# Patient Record
Sex: FEMALE | Race: WHITE | NOT HISPANIC OR LATINO
[De-identification: names, ages, dates, MRNs, and addresses within clinical notes are randomized per-mention and may not be internally consistent; named-entity substitution may affect disease eponyms.]

---

## 2019-03-30 ENCOUNTER — TRANSCRIPTION ENCOUNTER (OUTPATIENT)
Age: 79
End: 2019-03-30

## 2019-06-24 ENCOUNTER — TRANSCRIPTION ENCOUNTER (OUTPATIENT)
Age: 79
End: 2019-06-24

## 2020-07-17 ENCOUNTER — APPOINTMENT (OUTPATIENT)
Dept: CARDIOTHORACIC SURGERY | Facility: CLINIC | Age: 80
End: 2020-07-17
Payer: MEDICARE

## 2020-07-17 VITALS
WEIGHT: 148 LBS | BODY MASS INDEX: 29.84 KG/M2 | TEMPERATURE: 97.7 F | SYSTOLIC BLOOD PRESSURE: 130 MMHG | DIASTOLIC BLOOD PRESSURE: 71 MMHG | HEART RATE: 81 BPM | OXYGEN SATURATION: 96 % | HEIGHT: 59 IN

## 2020-07-17 PROCEDURE — 99204 OFFICE O/P NEW MOD 45 MIN: CPT

## 2020-07-20 RX ORDER — MULTIVITAMIN
TABLET ORAL
Refills: 0 | Status: ACTIVE | COMMUNITY

## 2020-07-20 NOTE — PHYSICAL EXAM
[General Appearance - Alert] : alert [General Appearance - In No Acute Distress] : in no acute distress [Sclera] : the sclera and conjunctiva were normal [Neck Appearance] : the appearance of the neck was normal [Outer Ear] : the ears and nose were normal in appearance [Jugular Venous Distention Increased] : there was no jugular-venous distention [] : no respiratory distress [Exaggerated Use Of Accessory Muscles For Inspiration] : no accessory muscle use [Auscultation Breath Sounds / Voice Sounds] : lungs were clear to auscultation bilaterally [Respiration, Rhythm And Depth] : normal respiratory rhythm and effort [Apical Impulse] : the apical impulse was normal [Heart Rate And Rhythm] : heart rate was normal and rhythm regular [Diminished Respiratory Excursion] : normal chest expansion [Examination Of The Chest] : the chest was normal in appearance [Chest Visual Inspection Thoracic Asymmetry] : no chest asymmetry [Abdomen Soft] : soft [Bowel Sounds] : normal bowel sounds [Abdomen Tenderness] : non-tender [Abnormal Walk] : normal gait [No Focal Deficits] : no focal deficits [Oriented To Time, Place, And Person] : oriented to person, place, and time [Skin Color & Pigmentation] : normal skin color and pigmentation [FreeTextEntry1] : Systolic murmur best heard at the apex. 1+ pitting edema to bilateral LEs.

## 2020-07-20 NOTE — REVIEW OF SYSTEMS
[Feeling Tired] : feeling tired [Lower Ext Edema] : lower extremity edema [Cough] : cough [SOB on Exertion] : shortness of breath during exertion [Negative] : Psychiatric [Fever] : no fever [Chills] : no chills [Feeling Poorly] : not feeling poorly [Heart Rate Is Slow] : the heart rate was not slow [Heart Rate Is Fast] : the heart rate was not fast [Chest Pain] : no chest pain [Palpitations] : no palpitations [Shortness Of Breath] : no shortness of breath [Leg Claudication] : no intermittent leg claudication [Wheezing] : no wheezing [PND] : no PND [Orthopnea] : no orthopnea

## 2020-07-20 NOTE — HISTORY OF PRESENT ILLNESS
[FreeTextEntry1] : \par 80 year old female with a history of HTN, gout and chronic diastolic heart failure with severe mitral regurgitation who has been referred for further evaluation of her valvular heart disease.\par \par The patient was admitted to [facility] in February for heart failure. An ECHO done at that time showed a normal EF and moderate to severe mitral regurgitation with a ruptured chordae; PA pressure 80 mmHg. \par \par Since then, the patient has continued to complaint of a persistent, frothy cough. She also reports intermittent LE edema. The patient has SOB when walking up the stairs or doing laundry; she denies SOB at rest. She also denies chest pain, orthopnea, PND, dizziness, syncope and palpitations. The patient has been monitoring her daily weights and they are stable. \par \par The patient lives at home by herself. She remains independent in her ADLs. Her daughter lives close by.

## 2020-08-16 ENCOUNTER — RESULT REVIEW (OUTPATIENT)
Age: 80
End: 2020-08-16

## 2020-08-18 ENCOUNTER — FORM ENCOUNTER (OUTPATIENT)
Age: 80
End: 2020-08-18

## 2020-08-19 ENCOUNTER — APPOINTMENT (OUTPATIENT)
Dept: CARDIOTHORACIC SURGERY | Facility: CLINIC | Age: 80
End: 2020-08-19
Payer: MEDICARE

## 2020-08-19 ENCOUNTER — OUTPATIENT (OUTPATIENT)
Dept: OUTPATIENT SERVICES | Facility: HOSPITAL | Age: 80
LOS: 1 days | End: 2020-08-19
Payer: MEDICARE

## 2020-08-19 ENCOUNTER — TRANSCRIPTION ENCOUNTER (OUTPATIENT)
Age: 80
End: 2020-08-19

## 2020-08-19 VITALS
RESPIRATION RATE: 18 BRPM | BODY MASS INDEX: 27.82 KG/M2 | SYSTOLIC BLOOD PRESSURE: 132 MMHG | TEMPERATURE: 96.8 F | DIASTOLIC BLOOD PRESSURE: 65 MMHG | HEIGHT: 59 IN | HEART RATE: 66 BPM | OXYGEN SATURATION: 96 % | WEIGHT: 138 LBS

## 2020-08-19 DIAGNOSIS — I34.0 NONRHEUMATIC MITRAL (VALVE) INSUFFICIENCY: ICD-10-CM

## 2020-08-19 PROCEDURE — 93306 TTE W/DOPPLER COMPLETE: CPT | Mod: 26

## 2020-08-19 PROCEDURE — 93312 ECHO TRANSESOPHAGEAL: CPT

## 2020-08-19 PROCEDURE — 76377 3D RENDER W/INTRP POSTPROCES: CPT | Mod: 26

## 2020-08-19 PROCEDURE — 99202 OFFICE O/P NEW SF 15 MIN: CPT

## 2020-08-19 PROCEDURE — 99214 OFFICE O/P EST MOD 30 MIN: CPT

## 2020-08-20 NOTE — REASON FOR VISIT
[Follow-Up: _____] : a [unfilled] follow-up visit [Family Member] : family member [FreeTextEntry1] : mitral regurgitation.

## 2020-08-20 NOTE — PHYSICAL EXAM
[Sclera] : the sclera and conjunctiva were normal [Jugular Venous Distention Increased] : there was no jugular-venous distention [Neck Appearance] : the appearance of the neck was normal [Respiration, Rhythm And Depth] : normal respiratory rhythm and effort [Exaggerated Use Of Accessory Muscles For Inspiration] : no accessory muscle use [] : no respiratory distress [Apical Impulse] : the apical impulse was normal [Auscultation Breath Sounds / Voice Sounds] : lungs were clear to auscultation bilaterally [Heart Rate And Rhythm] : heart rate was normal and rhythm regular [Examination Of The Chest] : the chest was normal in appearance [Chest Visual Inspection Thoracic Asymmetry] : no chest asymmetry [Diminished Respiratory Excursion] : normal chest expansion [Abdomen Soft] : soft [Abdomen Tenderness] : non-tender [Abnormal Walk] : normal gait [Skin Color & Pigmentation] : normal skin color and pigmentation [No Focal Deficits] : no focal deficits [General Appearance - In No Acute Distress] : in no acute distress [General Appearance - Alert] : alert [Oriented To Time, Place, And Person] : oriented to person, place, and time [Outer Ear] : the ears and nose were normal in appearance [FreeTextEntry1] : Systolic murmur best heard at the apex. 1+ pitting edema to bilateral LEs.

## 2020-08-20 NOTE — REVIEW OF SYSTEMS
[Feeling Tired] : feeling tired [Lower Ext Edema] : lower extremity edema [SOB on Exertion] : shortness of breath during exertion [Cough] : cough [Negative] : Neurological [Fever] : no fever [Chills] : no chills [Feeling Poorly] : not feeling poorly [Heart Rate Is Slow] : the heart rate was not slow [Heart Rate Is Fast] : the heart rate was not fast [Chest Pain] : no chest pain [Palpitations] : no palpitations [Leg Claudication] : no intermittent leg claudication [Wheezing] : no wheezing [Shortness Of Breath] : no shortness of breath [PND] : no PND [Orthopnea] : no orthopnea

## 2020-08-21 NOTE — REVIEW OF SYSTEMS
[Feeling Tired] : feeling tired [Lower Ext Edema] : lower extremity edema [Cough] : cough [SOB on Exertion] : shortness of breath during exertion [Negative] : Psychiatric [Chills] : no chills [Feeling Poorly] : not feeling poorly [Fever] : no fever [Chest Pain] : no chest pain [Heart Rate Is Fast] : the heart rate was not fast [Heart Rate Is Slow] : the heart rate was not slow [Leg Claudication] : no intermittent leg claudication [Shortness Of Breath] : no shortness of breath [Wheezing] : no wheezing [Palpitations] : no palpitations [PND] : no PND [Orthopnea] : no orthopnea

## 2020-08-21 NOTE — HISTORY OF PRESENT ILLNESS
[FreeTextEntry1] : 80 year old female with a history of HTN, gout and chronic diastolic heart failure with severe mitral regurgitation who present for follow up after testing.\par \par The patient continues to complaint of a persistent cough and intermittent LE edema. The patient has SOB when walking up the stairs or doing laundry; she denies SOB at rest. She also denies chest pain, orthopnea, PND, dizziness, syncope and palpitations.\par \par The patient lives at home by herself. She remains independent in her ADLs. Her daughter lives close by.

## 2020-08-21 NOTE — PHYSICAL EXAM
[Sclera] : the sclera and conjunctiva were normal [Neck Appearance] : the appearance of the neck was normal [] : no respiratory distress [Jugular Venous Distention Increased] : there was no jugular-venous distention [Exaggerated Use Of Accessory Muscles For Inspiration] : no accessory muscle use [Auscultation Breath Sounds / Voice Sounds] : lungs were clear to auscultation bilaterally [Respiration, Rhythm And Depth] : normal respiratory rhythm and effort [Apical Impulse] : the apical impulse was normal [Heart Rate And Rhythm] : heart rate was normal and rhythm regular [Diminished Respiratory Excursion] : normal chest expansion [Abdomen Soft] : soft [Chest Visual Inspection Thoracic Asymmetry] : no chest asymmetry [Examination Of The Chest] : the chest was normal in appearance [Abnormal Walk] : normal gait [Skin Color & Pigmentation] : normal skin color and pigmentation [Abdomen Tenderness] : non-tender [No Focal Deficits] : no focal deficits [Oriented To Time, Place, And Person] : oriented to person, place, and time [General Appearance - In No Acute Distress] : in no acute distress [Outer Ear] : the ears and nose were normal in appearance [General Appearance - Alert] : alert [FreeTextEntry1] : Systolic murmur best heard at the apex. 1+ pitting edema to bilateral LEs.

## 2020-09-21 ENCOUNTER — RESULT REVIEW (OUTPATIENT)
Age: 80
End: 2020-09-21

## 2020-09-23 ENCOUNTER — TRANSCRIPTION ENCOUNTER (OUTPATIENT)
Age: 80
End: 2020-09-23

## 2020-09-23 VITALS
TEMPERATURE: 98 F | WEIGHT: 138.01 LBS | HEART RATE: 76 BPM | SYSTOLIC BLOOD PRESSURE: 131 MMHG | OXYGEN SATURATION: 98 % | HEIGHT: 59 IN | RESPIRATION RATE: 16 BRPM | DIASTOLIC BLOOD PRESSURE: 70 MMHG

## 2020-09-23 RX ORDER — INFLUENZA VIRUS VACCINE 15; 15; 15; 15 UG/.5ML; UG/.5ML; UG/.5ML; UG/.5ML
0.5 SUSPENSION INTRAMUSCULAR ONCE
Refills: 0 | Status: DISCONTINUED | OUTPATIENT
Start: 2020-09-24 | End: 2020-09-24

## 2020-09-24 ENCOUNTER — APPOINTMENT (OUTPATIENT)
Dept: CARDIOTHORACIC SURGERY | Facility: HOSPITAL | Age: 80
End: 2020-09-24

## 2020-09-24 ENCOUNTER — INPATIENT (INPATIENT)
Facility: HOSPITAL | Age: 80
LOS: 0 days | Discharge: HOME | DRG: 266 | End: 2020-09-25
Attending: THORACIC SURGERY (CARDIOTHORACIC VASCULAR SURGERY) | Admitting: THORACIC SURGERY (CARDIOTHORACIC VASCULAR SURGERY)
Payer: MEDICARE

## 2020-09-24 LAB
ALBUMIN SERPL ELPH-MCNC: 3.3 G/DL — SIGNIFICANT CHANGE UP (ref 3.3–5)
ALBUMIN SERPL ELPH-MCNC: 3.5 G/DL — SIGNIFICANT CHANGE UP (ref 3.3–5)
ALBUMIN SERPL ELPH-MCNC: 4.6 G/DL — SIGNIFICANT CHANGE UP (ref 3.3–5)
ALP SERPL-CCNC: 59 U/L — SIGNIFICANT CHANGE UP (ref 40–120)
ALP SERPL-CCNC: 63 U/L — SIGNIFICANT CHANGE UP (ref 40–120)
ALP SERPL-CCNC: 74 U/L — SIGNIFICANT CHANGE UP (ref 40–120)
ALT FLD-CCNC: 12 U/L — SIGNIFICANT CHANGE UP (ref 10–45)
ALT FLD-CCNC: 13 U/L — SIGNIFICANT CHANGE UP (ref 10–45)
ALT FLD-CCNC: 15 U/L — SIGNIFICANT CHANGE UP (ref 10–45)
ANION GAP SERPL CALC-SCNC: 10 MMOL/L — SIGNIFICANT CHANGE UP (ref 5–17)
ANION GAP SERPL CALC-SCNC: 12 MMOL/L — SIGNIFICANT CHANGE UP (ref 5–17)
ANION GAP SERPL CALC-SCNC: 12 MMOL/L — SIGNIFICANT CHANGE UP (ref 5–17)
APTT BLD: 28.4 SEC — SIGNIFICANT CHANGE UP (ref 27.5–35.5)
APTT BLD: 31.4 SEC — SIGNIFICANT CHANGE UP (ref 27.5–35.5)
APTT BLD: 44.7 SEC — HIGH (ref 27.5–35.5)
AST SERPL-CCNC: 21 U/L — SIGNIFICANT CHANGE UP (ref 10–40)
AST SERPL-CCNC: 22 U/L — SIGNIFICANT CHANGE UP (ref 10–40)
AST SERPL-CCNC: 28 U/L — SIGNIFICANT CHANGE UP (ref 10–40)
BASOPHILS # BLD AUTO: 0.03 K/UL — SIGNIFICANT CHANGE UP (ref 0–0.2)
BASOPHILS NFR BLD AUTO: 0.5 % — SIGNIFICANT CHANGE UP (ref 0–2)
BILIRUB SERPL-MCNC: 0.4 MG/DL — SIGNIFICANT CHANGE UP (ref 0.2–1.2)
BILIRUB SERPL-MCNC: 0.5 MG/DL — SIGNIFICANT CHANGE UP (ref 0.2–1.2)
BILIRUB SERPL-MCNC: 1 MG/DL — SIGNIFICANT CHANGE UP (ref 0.2–1.2)
BLD GP AB SCN SERPL QL: NEGATIVE — SIGNIFICANT CHANGE UP
BUN SERPL-MCNC: 20 MG/DL — SIGNIFICANT CHANGE UP (ref 7–23)
BUN SERPL-MCNC: 21 MG/DL — SIGNIFICANT CHANGE UP (ref 7–23)
BUN SERPL-MCNC: 21 MG/DL — SIGNIFICANT CHANGE UP (ref 7–23)
CALCIUM SERPL-MCNC: 10 MG/DL — SIGNIFICANT CHANGE UP (ref 8.4–10.5)
CALCIUM SERPL-MCNC: 8.9 MG/DL — SIGNIFICANT CHANGE UP (ref 8.4–10.5)
CALCIUM SERPL-MCNC: 9.3 MG/DL — SIGNIFICANT CHANGE UP (ref 8.4–10.5)
CHLORIDE SERPL-SCNC: 100 MMOL/L — SIGNIFICANT CHANGE UP (ref 96–108)
CHLORIDE SERPL-SCNC: 105 MMOL/L — SIGNIFICANT CHANGE UP (ref 96–108)
CHLORIDE SERPL-SCNC: 107 MMOL/L — SIGNIFICANT CHANGE UP (ref 96–108)
CO2 SERPL-SCNC: 25 MMOL/L — SIGNIFICANT CHANGE UP (ref 22–31)
CO2 SERPL-SCNC: 26 MMOL/L — SIGNIFICANT CHANGE UP (ref 22–31)
CO2 SERPL-SCNC: 29 MMOL/L — SIGNIFICANT CHANGE UP (ref 22–31)
CREAT SERPL-MCNC: 0.91 MG/DL — SIGNIFICANT CHANGE UP (ref 0.5–1.3)
CREAT SERPL-MCNC: 1.07 MG/DL — SIGNIFICANT CHANGE UP (ref 0.5–1.3)
CREAT SERPL-MCNC: 1.11 MG/DL — SIGNIFICANT CHANGE UP (ref 0.5–1.3)
EOSINOPHIL # BLD AUTO: 0.08 K/UL — SIGNIFICANT CHANGE UP (ref 0–0.5)
EOSINOPHIL NFR BLD AUTO: 1.4 % — SIGNIFICANT CHANGE UP (ref 0–6)
GAS PNL BLDA: SIGNIFICANT CHANGE UP
GAS PNL BLDA: SIGNIFICANT CHANGE UP
GLUCOSE SERPL-MCNC: 110 MG/DL — HIGH (ref 70–99)
GLUCOSE SERPL-MCNC: 117 MG/DL — HIGH (ref 70–99)
GLUCOSE SERPL-MCNC: 123 MG/DL — HIGH (ref 70–99)
HCT VFR BLD CALC: 31.3 % — LOW (ref 34.5–45)
HCT VFR BLD CALC: 32.9 % — LOW (ref 34.5–45)
HCT VFR BLD CALC: 39.1 % — SIGNIFICANT CHANGE UP (ref 34.5–45)
HGB BLD-MCNC: 10.3 G/DL — LOW (ref 11.5–15.5)
HGB BLD-MCNC: 10.6 G/DL — LOW (ref 11.5–15.5)
HGB BLD-MCNC: 12.6 G/DL — SIGNIFICANT CHANGE UP (ref 11.5–15.5)
IMM GRANULOCYTES NFR BLD AUTO: 0.4 % — SIGNIFICANT CHANGE UP (ref 0–1.5)
INR BLD: 0.98 — SIGNIFICANT CHANGE UP (ref 0.88–1.16)
INR BLD: 1.07 — SIGNIFICANT CHANGE UP (ref 0.88–1.16)
INR BLD: 1.12 — SIGNIFICANT CHANGE UP (ref 0.88–1.16)
LACTATE SERPL-SCNC: 0.9 MMOL/L — SIGNIFICANT CHANGE UP (ref 0.5–2)
LACTATE SERPL-SCNC: 1.4 MMOL/L — SIGNIFICANT CHANGE UP (ref 0.5–2)
LYMPHOCYTES # BLD AUTO: 1.63 K/UL — SIGNIFICANT CHANGE UP (ref 1–3.3)
LYMPHOCYTES # BLD AUTO: 29 % — SIGNIFICANT CHANGE UP (ref 13–44)
MAGNESIUM SERPL-MCNC: 1.8 MG/DL — SIGNIFICANT CHANGE UP (ref 1.6–2.6)
MAGNESIUM SERPL-MCNC: 2.3 MG/DL — SIGNIFICANT CHANGE UP (ref 1.6–2.6)
MCHC RBC-ENTMCNC: 31 PG — SIGNIFICANT CHANGE UP (ref 27–34)
MCHC RBC-ENTMCNC: 31.2 PG — SIGNIFICANT CHANGE UP (ref 27–34)
MCHC RBC-ENTMCNC: 31.4 PG — SIGNIFICANT CHANGE UP (ref 27–34)
MCHC RBC-ENTMCNC: 32.2 GM/DL — SIGNIFICANT CHANGE UP (ref 32–36)
MCHC RBC-ENTMCNC: 32.2 GM/DL — SIGNIFICANT CHANGE UP (ref 32–36)
MCHC RBC-ENTMCNC: 32.9 GM/DL — SIGNIFICANT CHANGE UP (ref 32–36)
MCV RBC AUTO: 94.8 FL — SIGNIFICANT CHANGE UP (ref 80–100)
MCV RBC AUTO: 96.2 FL — SIGNIFICANT CHANGE UP (ref 80–100)
MCV RBC AUTO: 97.5 FL — SIGNIFICANT CHANGE UP (ref 80–100)
MONOCYTES # BLD AUTO: 0.35 K/UL — SIGNIFICANT CHANGE UP (ref 0–0.9)
MONOCYTES NFR BLD AUTO: 6.2 % — SIGNIFICANT CHANGE UP (ref 2–14)
NEUTROPHILS # BLD AUTO: 3.52 K/UL — SIGNIFICANT CHANGE UP (ref 1.8–7.4)
NEUTROPHILS NFR BLD AUTO: 62.5 % — SIGNIFICANT CHANGE UP (ref 43–77)
NRBC # BLD: 0 /100 WBCS — SIGNIFICANT CHANGE UP (ref 0–0)
PHOSPHATE SERPL-MCNC: 3.7 MG/DL — SIGNIFICANT CHANGE UP (ref 2.5–4.5)
PHOSPHATE SERPL-MCNC: 4.7 MG/DL — HIGH (ref 2.5–4.5)
PLATELET # BLD AUTO: 136 K/UL — LOW (ref 150–400)
PLATELET # BLD AUTO: 141 K/UL — LOW (ref 150–400)
PLATELET # BLD AUTO: 176 K/UL — SIGNIFICANT CHANGE UP (ref 150–400)
POTASSIUM SERPL-MCNC: 3.2 MMOL/L — LOW (ref 3.5–5.3)
POTASSIUM SERPL-MCNC: 3.4 MMOL/L — LOW (ref 3.5–5.3)
POTASSIUM SERPL-MCNC: 4 MMOL/L — SIGNIFICANT CHANGE UP (ref 3.5–5.3)
POTASSIUM SERPL-SCNC: 3.2 MMOL/L — LOW (ref 3.5–5.3)
POTASSIUM SERPL-SCNC: 3.4 MMOL/L — LOW (ref 3.5–5.3)
POTASSIUM SERPL-SCNC: 4 MMOL/L — SIGNIFICANT CHANGE UP (ref 3.5–5.3)
PROT SERPL-MCNC: 6.2 G/DL — SIGNIFICANT CHANGE UP (ref 6–8.3)
PROT SERPL-MCNC: 6.2 G/DL — SIGNIFICANT CHANGE UP (ref 6–8.3)
PROT SERPL-MCNC: 8.1 G/DL — SIGNIFICANT CHANGE UP (ref 6–8.3)
PROTHROM AB SERPL-ACNC: 11.8 SEC — SIGNIFICANT CHANGE UP (ref 10.6–13.6)
PROTHROM AB SERPL-ACNC: 12.8 SEC — SIGNIFICANT CHANGE UP (ref 10.6–13.6)
PROTHROM AB SERPL-ACNC: 13.4 SEC — SIGNIFICANT CHANGE UP (ref 10.6–13.6)
RBC # BLD: 3.3 M/UL — LOW (ref 3.8–5.2)
RBC # BLD: 3.42 M/UL — LOW (ref 3.8–5.2)
RBC # BLD: 4.01 M/UL — SIGNIFICANT CHANGE UP (ref 3.8–5.2)
RBC # FLD: 13.2 % — SIGNIFICANT CHANGE UP (ref 10.3–14.5)
RBC # FLD: 13.3 % — SIGNIFICANT CHANGE UP (ref 10.3–14.5)
RBC # FLD: 13.4 % — SIGNIFICANT CHANGE UP (ref 10.3–14.5)
RH IG SCN BLD-IMP: POSITIVE — SIGNIFICANT CHANGE UP
SODIUM SERPL-SCNC: 141 MMOL/L — SIGNIFICANT CHANGE UP (ref 135–145)
SODIUM SERPL-SCNC: 142 MMOL/L — SIGNIFICANT CHANGE UP (ref 135–145)
SODIUM SERPL-SCNC: 143 MMOL/L — SIGNIFICANT CHANGE UP (ref 135–145)
WBC # BLD: 5.63 K/UL — SIGNIFICANT CHANGE UP (ref 3.8–10.5)
WBC # BLD: 8.02 K/UL — SIGNIFICANT CHANGE UP (ref 3.8–10.5)
WBC # BLD: 8.23 K/UL — SIGNIFICANT CHANGE UP (ref 3.8–10.5)
WBC # FLD AUTO: 5.63 K/UL — SIGNIFICANT CHANGE UP (ref 3.8–10.5)
WBC # FLD AUTO: 8.02 K/UL — SIGNIFICANT CHANGE UP (ref 3.8–10.5)
WBC # FLD AUTO: 8.23 K/UL — SIGNIFICANT CHANGE UP (ref 3.8–10.5)

## 2020-09-24 PROCEDURE — 93010 ELECTROCARDIOGRAM REPORT: CPT

## 2020-09-24 PROCEDURE — 76376 3D RENDER W/INTRP POSTPROCES: CPT | Mod: 26,59

## 2020-09-24 PROCEDURE — 71045 X-RAY EXAM CHEST 1 VIEW: CPT | Mod: 26

## 2020-09-24 PROCEDURE — 93355 ECHO TRANSESOPHAGEAL (TEE): CPT

## 2020-09-24 PROCEDURE — 33418 REPAIR TCAT MITRAL VALVE: CPT | Mod: 62,Q0

## 2020-09-24 RX ORDER — HEPARIN SODIUM 5000 [USP'U]/ML
5000 INJECTION INTRAVENOUS; SUBCUTANEOUS EVERY 8 HOURS
Refills: 0 | Status: DISCONTINUED | OUTPATIENT
Start: 2020-09-24 | End: 2020-09-25

## 2020-09-24 RX ORDER — POTASSIUM CHLORIDE 20 MEQ
20 PACKET (EA) ORAL
Refills: 0 | Status: DISCONTINUED | OUTPATIENT
Start: 2020-09-24 | End: 2020-09-25

## 2020-09-24 RX ORDER — SODIUM CHLORIDE 9 MG/ML
1000 INJECTION INTRAMUSCULAR; INTRAVENOUS; SUBCUTANEOUS
Refills: 0 | Status: DISCONTINUED | OUTPATIENT
Start: 2020-09-24 | End: 2020-09-25

## 2020-09-24 RX ORDER — CHOLECALCIFEROL (VITAMIN D3) 125 MCG
0 CAPSULE ORAL
Qty: 0 | Refills: 0 | DISCHARGE

## 2020-09-24 RX ORDER — HYDRALAZINE HCL 50 MG
5 TABLET ORAL ONCE
Refills: 0 | Status: COMPLETED | OUTPATIENT
Start: 2020-09-24 | End: 2020-09-24

## 2020-09-24 RX ORDER — METOPROLOL TARTRATE 50 MG
2.5 TABLET ORAL ONCE
Refills: 0 | Status: COMPLETED | OUTPATIENT
Start: 2020-09-24 | End: 2020-09-24

## 2020-09-24 RX ORDER — MAGNESIUM SULFATE 500 MG/ML
2 VIAL (ML) INJECTION ONCE
Refills: 0 | Status: COMPLETED | OUTPATIENT
Start: 2020-09-24 | End: 2020-09-24

## 2020-09-24 RX ORDER — MAGNESIUM SULFATE 500 MG/ML
2 VIAL (ML) INJECTION ONCE
Refills: 0 | Status: DISCONTINUED | OUTPATIENT
Start: 2020-09-24 | End: 2020-09-24

## 2020-09-24 RX ORDER — CALCIUM GLUCONATE 100 MG/ML
2 VIAL (ML) INTRAVENOUS ONCE
Refills: 0 | Status: COMPLETED | OUTPATIENT
Start: 2020-09-24 | End: 2020-09-25

## 2020-09-24 RX ORDER — HYDRALAZINE HCL 50 MG
5 TABLET ORAL ONCE
Refills: 0 | Status: DISCONTINUED | OUTPATIENT
Start: 2020-09-24 | End: 2020-09-24

## 2020-09-24 RX ORDER — INFLUENZA VIRUS VACCINE 15; 15; 15; 15 UG/.5ML; UG/.5ML; UG/.5ML; UG/.5ML
0.7 SUSPENSION INTRAMUSCULAR ONCE
Refills: 0 | Status: DISCONTINUED | OUTPATIENT
Start: 2020-09-24 | End: 2020-09-25

## 2020-09-24 RX ORDER — ASPIRIN/CALCIUM CARB/MAGNESIUM 324 MG
81 TABLET ORAL DAILY
Refills: 0 | Status: DISCONTINUED | OUTPATIENT
Start: 2020-09-25 | End: 2020-09-25

## 2020-09-24 RX ORDER — POTASSIUM CHLORIDE 20 MEQ
10 PACKET (EA) ORAL ONCE
Refills: 0 | Status: COMPLETED | OUTPATIENT
Start: 2020-09-24 | End: 2020-09-24

## 2020-09-24 RX ORDER — ACETAMINOPHEN 500 MG
1000 TABLET ORAL ONCE
Refills: 0 | Status: COMPLETED | OUTPATIENT
Start: 2020-09-24 | End: 2020-09-24

## 2020-09-24 RX ORDER — MEPERIDINE HYDROCHLORIDE 50 MG/ML
25 INJECTION INTRAMUSCULAR; INTRAVENOUS; SUBCUTANEOUS ONCE
Refills: 0 | Status: DISCONTINUED | OUTPATIENT
Start: 2020-09-24 | End: 2020-09-24

## 2020-09-24 RX ORDER — FUROSEMIDE 40 MG
20 TABLET ORAL ONCE
Refills: 0 | Status: COMPLETED | OUTPATIENT
Start: 2020-09-24 | End: 2020-09-24

## 2020-09-24 RX ORDER — CLOPIDOGREL BISULFATE 75 MG/1
75 TABLET, FILM COATED ORAL DAILY
Refills: 0 | Status: DISCONTINUED | OUTPATIENT
Start: 2020-09-25 | End: 2020-09-25

## 2020-09-24 RX ORDER — ASPIRIN/CALCIUM CARB/MAGNESIUM 324 MG
325 TABLET ORAL ONCE
Refills: 0 | Status: COMPLETED | OUTPATIENT
Start: 2020-09-24 | End: 2020-09-24

## 2020-09-24 RX ORDER — ALLOPURINOL 300 MG
100 TABLET ORAL DAILY
Refills: 0 | Status: DISCONTINUED | OUTPATIENT
Start: 2020-09-25 | End: 2020-09-25

## 2020-09-24 RX ORDER — CLOPIDOGREL BISULFATE 75 MG/1
300 TABLET, FILM COATED ORAL ONCE
Refills: 0 | Status: COMPLETED | OUTPATIENT
Start: 2020-09-24 | End: 2020-09-24

## 2020-09-24 RX ORDER — PANTOPRAZOLE SODIUM 20 MG/1
40 TABLET, DELAYED RELEASE ORAL
Refills: 0 | Status: DISCONTINUED | OUTPATIENT
Start: 2020-09-24 | End: 2020-09-25

## 2020-09-24 RX ORDER — POTASSIUM CHLORIDE 20 MEQ
20 PACKET (EA) ORAL ONCE
Refills: 0 | Status: COMPLETED | OUTPATIENT
Start: 2020-09-24 | End: 2020-09-24

## 2020-09-24 RX ORDER — CEFAZOLIN SODIUM 1 G
2000 VIAL (EA) INJECTION EVERY 8 HOURS
Refills: 0 | Status: DISCONTINUED | OUTPATIENT
Start: 2020-09-24 | End: 2020-09-25

## 2020-09-24 RX ADMIN — Medication 2.5 MILLIGRAM(S): at 15:11

## 2020-09-24 RX ADMIN — Medication 2000 MILLIGRAM(S): at 15:52

## 2020-09-24 RX ADMIN — Medication 400 MILLIGRAM(S): at 12:56

## 2020-09-24 RX ADMIN — Medication 5 MILLIGRAM(S): at 15:21

## 2020-09-24 RX ADMIN — Medication 2000 MILLIGRAM(S): at 23:52

## 2020-09-24 RX ADMIN — Medication 50 MILLIEQUIVALENT(S): at 14:18

## 2020-09-24 RX ADMIN — Medication 325 MILLIGRAM(S): at 07:22

## 2020-09-24 RX ADMIN — Medication 50 GRAM(S): at 14:49

## 2020-09-24 RX ADMIN — Medication 2.5 MILLIGRAM(S): at 14:49

## 2020-09-24 RX ADMIN — HEPARIN SODIUM 5000 UNIT(S): 5000 INJECTION INTRAVENOUS; SUBCUTANEOUS at 21:17

## 2020-09-24 RX ADMIN — CLOPIDOGREL BISULFATE 300 MILLIGRAM(S): 75 TABLET, FILM COATED ORAL at 07:22

## 2020-09-24 RX ADMIN — Medication 1000 MILLIGRAM(S): at 13:15

## 2020-09-24 RX ADMIN — Medication 5 MILLIGRAM(S): at 15:52

## 2020-09-24 RX ADMIN — Medication 20 MILLIGRAM(S): at 15:29

## 2020-09-24 NOTE — H&P ADULT - NSHPPHYSICALEXAM_GEN_ALL_CORE
Appearance: No acute distress.  Neurologic: AAOx3, no AMS or focal deficits.  Responds appropriately to verbal and physical stimuli; exhibits purposeful movement in all extremities.  HEENT:   MMM, PERRLA, EOMI	b/l  Neck: Supple, + JVD/ - JVD; +/- Carotid Bruit   Cardiovascular: RRR, S1 S2. No m/r/g.  Respiratory: No acute respiratory distress. CTA b/l, no w/r/r.   Gastrointestinal:  Soft, non-tender, non-distended, + BS.	  Skin: No rashes. No ecchymoses. No cyanosis.  Extremities: Exhibits normal range of motion, no clubbing, cyanosis or edema.  Vascular: Peripheral pulses palpable 2+ bilaterally. Appearance: No acute distress.  Neurologic: AAOx3, no AMS or focal deficits.  Responds appropriately to verbal and physical stimuli; exhibits purposeful movement in all extremities.  HEENT:   MMM, PERRLA, EOMI	b/l  Neck: Supple,  - JVD; - Carotid Bruit   Cardiovascular: RRR, S1 S2. No m/r/g.  Respiratory: No acute respiratory distress. CTA b/l, no w/r/r.   Gastrointestinal:  Soft, non-tender, non-distended, + BS.	  Skin: No rashes. No ecchymoses. No cyanosis.  Extremities: Exhibits normal range of motion, no clubbing, cyanosis or edema.  Vascular: Peripheral pulses doppler + b/l PT and DP

## 2020-09-24 NOTE — H&P ADULT - ASSESSMENT
79 y/o female with hx of HTN, gout and chronic diastolic CHF with severe MR who originally presented with persistent cough and intermittent LE edema. She also states he has SOB when walking up the stairs or doing laundry but denies SOB at rest. Recent echo showed echo showed normal LV and RV function, bileaflet mitral valve prolapse involving A1-P1 and A2-Pr with flail A1-A2 scallops, and a cleft between P1-P2. Reely mobile linear echodensity on the atrial side of the anterior mitral leaflet measuring 1cm that likely represents a torn mitral chord with severe MR. Patient was evaluated by Dr. Núñez and was found to be a candidate for mitraclip placement. She denies any recent CP, palpitations, orthopnea, PND, dizziness, syncope. She now presents for her planned L and R heart cath with possible PCI and mitraclip placement    - chart reviewed, consent obtained  - EKG, labs, T&S performed  - blood products on hold for OR  - COVID negative 9/21  - patient received  mg and Plavix 300 mg prior to procedure  - plan for ICU post op

## 2020-09-24 NOTE — PROGRESS NOTE ADULT - SUBJECTIVE AND OBJECTIVE BOX
Patient discussed on morning rounds with Dr. Núñez    Operation / Date: L and R heart cath, mitraclip X2    SUBJECTIVE ASSESSMENT:  80y Female assessed  at bedside after procedure. She is drowsy but appropriately responsive. Denies pain, CP, SOB. No acute complaints.     Vital Signs Last 24 Hrs  T(C): 36.7 (23 Sep 2020 16:23), Max: 36.7 (23 Sep 2020 16:23)  T(F): 98 (23 Sep 2020 16:23), Max: 98 (23 Sep 2020 16:23)  HR: 72 (24 Sep 2020 14:00) (69 - 76)  BP: 131/70 (23 Sep 2020 16:23) (131/70 - 131/70)  BP(mean): --  RR: 16 (24 Sep 2020 14:00) (16 - 23)  SpO2: 100% (24 Sep 2020 14:00) (98% - 100%)  I&O's Detail    24 Sep 2020 07:01  -  24 Sep 2020 14:24  --------------------------------------------------------  IN:    IV PiggyBack: 100 mL  Total IN: 100 mL    OUT:  Total OUT: 0 mL    Total NET: 100 mL          CHEST TUBE:  Yes/No. AIR LEAKS: Yes/No. Suction / H2O SEAL.   ELIEL DRAIN:  Yes/No.  EPICARDIAL WIRES: Yes/No.  TIE DOWNS: Yes/No.  JORDAN: Yes/No.    PHYSICAL EXAM:    General:     Neurological:    Cardiovascular:    Respiratory:    Gastrointestinal:    Extremities:    Vascular:    Incision Sites:    LABS:                        10.6   5.63  )-----------( 136      ( 24 Sep 2020 12:13 )             32.9       COUMADIN:  Yes/No. REASON: .    PT/INR - ( 24 Sep 2020 12:13 )   PT: 13.4 sec;   INR: 1.12          PTT - ( 24 Sep 2020 12:13 )  PTT:44.7 sec    09-24    142  |  107  |  20  ----------------------------<  123<H>  3.2<L>   |  25  |  0.91    Ca    9.3      24 Sep 2020 12:13  Phos  4.7     09-24  Mg     1.8     09-24    TPro  6.2  /  Alb  3.3  /  TBili  0.5  /  DBili  x   /  AST  21  /  ALT  12  /  AlkPhos  59  09-24          MEDICATIONS  (STANDING):  ceFAZolin  Injectable. 2000 milliGRAM(s) IV Push every 8 hours  heparin   Injectable 5000 Unit(s) SubCutaneous every 8 hours  influenza  Vaccine (HIGH DOSE) 0.7 milliLiter(s) IntraMuscular once  magnesium sulfate  IVPB 2 Gram(s) IV Intermittent once  meperidine     Injectable 25 milliGRAM(s) IV Push once  pantoprazole    Tablet 40 milliGRAM(s) Oral before breakfast  sodium chloride 0.9%. 1000 milliLiter(s) (10 mL/Hr) IV Continuous <Continuous>    MEDICATIONS  (PRN):        RADIOLOGY & ADDITIONAL TESTS:     Patient discussed on morning rounds with Dr. Núñez    Operation / Date: L and R heart cath, mitraclip X2    SUBJECTIVE ASSESSMENT:  80y Female assessed  at bedside after procedure. She is drowsy but appropriately responsive. Denies pain, CP, SOB. No acute complaints.     Vital Signs Last 24 Hrs  T(C): 36.7 (23 Sep 2020 16:23), Max: 36.7 (23 Sep 2020 16:23)  T(F): 98 (23 Sep 2020 16:23), Max: 98 (23 Sep 2020 16:23)  HR: 72 (24 Sep 2020 14:00) (69 - 76)  BP: 131/70 (23 Sep 2020 16:23) (131/70 - 131/70)  BP(mean): --  RR: 16 (24 Sep 2020 14:00) (16 - 23)  SpO2: 100% (24 Sep 2020 14:00) (98% - 100%)  I&O's Detail    24 Sep 2020 07:01  -  24 Sep 2020 14:24  --------------------------------------------------------  IN:    IV PiggyBack: 100 mL  Total IN: 100 mL    OUT:  Total OUT: 0 mL    Total NET: 100 mL    CHEST TUBE:  No  ELIEL DRAIN:  No.  EPICARDIAL WIRES: No.  TIE DOWNS: Yes ******R GROIN TIE DOWN****  JORDAN: No.    Physical Exam  CONSTITUTIONAL: Well appearing in NAD assessed laying comfortably in bed   NEURO: A&OX3. No focal deficits noted, moving bilateral upper and lower extremities                    CV: RRR, no murmurs, rubs, gallops  RESPIRATORY: Clear to auscultation bilateral posterior lung fields, no wheezes, rales, rhonchi   GI: +BS, NT/ND  MUSKULOSKELETAL: No peripheral edema or calf tenderness. Full strength and ROM bilateral upper and lower extremities   VASCULAR: Bilateral distal pulses 2+  INCISIONS: R groin without hematoma. With tie down in place     LABS:                        10.6   5.63  )-----------( 136      ( 24 Sep 2020 12:13 )             32.9       COUMADIN:  No    PT/INR - ( 24 Sep 2020 12:13 )   PT: 13.4 sec;   INR: 1.12          PTT - ( 24 Sep 2020 12:13 )  PTT:44.7 sec    09-24    142  |  107  |  20  ----------------------------<  123<H>  3.2<L>   |  25  |  0.91    Ca    9.3      24 Sep 2020 12:13  Phos  4.7     09-24  Mg     1.8     09-24    TPro  6.2  /  Alb  3.3  /  TBili  0.5  /  DBili  x   /  AST  21  /  ALT  12  /  AlkPhos  59  09-24      MEDICATIONS  (STANDING):  ceFAZolin  Injectable. 2000 milliGRAM(s) IV Push every 8 hours  heparin   Injectable 5000 Unit(s) SubCutaneous every 8 hours  influenza  Vaccine (HIGH DOSE) 0.7 milliLiter(s) IntraMuscular once  magnesium sulfate  IVPB 2 Gram(s) IV Intermittent once  meperidine     Injectable 25 milliGRAM(s) IV Push once  pantoprazole    Tablet 40 milliGRAM(s) Oral before breakfast  sodium chloride 0.9%. 1000 milliLiter(s) (10 mL/Hr) IV Continuous <Continuous>    MEDICATIONS  (PRN):    RADIOLOGY & ADDITIONAL TESTS:  < from: YONATAN w/Doppler (09.24.20 @ 07:45) >    CONCLUSIONS:     1. PRE-PROCEDURE: There is prolapse of posterior leaflet (P2 scallop). Prolapse of A2 scallop with torn chordae tendinae. Flail P1 scallop. There is very severe, eccentric mitral regurgitation noted. The mean transmitral gradient is 1.93 mmHg at a heart rate of 70 bpm. Normal biventricular systolic function. Nopericardial effusion.   2. INTRA-PROCEDURE: Successful trans-septal puncture and guide insertion was performed. 1 XTW Mitraclip placed. The mean transmitral gradient was 2.27 mmHg at a heart rate of 60 bpm. There was still severe residual eccentric mitral regurgitation seen. A second XT Mitraclip was placed medial to the first clip.   3. POST-PROCEDURE: 2 Mitraclips seen in the mitral position. There is severe eccentric residual mitral regurgitation (much improved from pre-procedure images). The mean transmitral gradient is 3.3 mmHg at a heart rate of 61 bpm. No change in biventricular function. No pericardial effusion.    < end of copied text >    A/P:    79 y/o female with hx of HTN, gout and chronic diastolic CHF with severe MR who originally presented with persistent cough, intermittent LE edema, and SOB with exertion. A recent echo showed normal LV and RV function, bileaflet mitral valve prolapse involving A1-P1 and A2-Pr with flail A1-A2 scallops, and a cleft between P1-P2, mobile linear echodensity on the atrial side of the anterior mitral leaflet measuring 1cm that likely represents a torn mitral chord with severe MR. She was referred to Dr. Núñez and was found to be a candidate for Mitraclip placement. On 9/24/20 she underwent an uncomplicated left and right heart catheterization with Mitraclip placement X2. Post operatively she was brought to [facility] ([facility]ICU care).     Neurovascular:   - No delirium. Pain well controlled with current regimen.  -Continue tylenol PRN    Cardiovascular:   - POD0 s/p left and right heart catheterization with Mitraclip placement X2  -Hemodynamically stable. HR controlled (70-76)  - Hx of HTN, BP controlled (131//79), on atenolol 25 mg at home, will resume PO meds once can sit up   - Hx diastolic CHF: on furosemide 40 mg at home, holding for now   - To resume ASA, Plavix tomorrow   - Echo tomorrow 9/25    Respiratory:   - 02 Sat = 100% on 4L  -If on oxygen wean to RA from for O2 Sat > 93%.  -Encourage C+DB and Use of IS 10x / hr while awake.  -CXR     GI:   - Stable.  -NPO after MN.  -Continue GI PPX with protonix  -PO Diet.    Renal / :  - BUN/Cr stable at 20/0.91  -Continue to monitor renal function.  -Monitor I/O's.  - Replete electrolytes PRN    Endocrine:    - No known hx diabetes or thyroid disease     Hematologic:  -H/H stable at 20/0.91 with no obvious signs of bleeding  -Coagulation Panel.    ID:  -Pt remains afebrile with no elevation in WBC or signs of infection  -Continue to monitor CBC  -Observe for SIRS/Sepsis Syndrome.    Prophylaxis:  -DVT prophylaxis with 5000 SubQ Heparin q8h.  -SCD's    Disposition:  -Home when medically appropriate.

## 2020-09-24 NOTE — H&P ADULT - HISTORY OF PRESENT ILLNESS
79 y/o female with hx of HTN, gout and chronic diastolic CHF with severe MR who originally presented with persistent cough and intermittent LE edema. She also states he has SOB when walking up the stairs or doing laundry but denies SOB at rest. Recent echo showed echo showed normal LV and RV function, bileaflet mitral valve prolapse involving A1-P1 and A2-Pr with flail A1-A2 scallops, and a cleft between P1-P2. Reely mobile linear echodensity on the atrial side of the anterior mitral leaflet measuring 1cm that likely represents a torn mitral chord with severe MR. Patient was evaluated by Dr. Núñez and was found to be a candidate for mitraclip placement. She denies any recent CP, palpitations, orthopnea, PND, dizziness, syncope. She now presents for her planned L and R heart cath with mitraclip placement. She is in her usual state of health with no acute complaints.

## 2020-09-24 NOTE — H&P ADULT - NSHPREVIEWOFSYSTEMS_GEN_ALL_CORE
Review of Systems  CONSTITUTIONAL:  Denies Fevers / chills, sweats, fatigue, weight loss, weight gain                                      NEURO:  Denies parathesias, seizures, syncope, confusion                                                                                EYES:  Denies Blurry vision, discharge, pain, loss of vision                                                                                    ENMT:  Denies Difficulty hearing, vertigo, dysphagia, epistaxis, recent dental work                                       CV:  Denies Chest pain, palpitations, GOMEZ, orthopnea                                                                                          RESPIRATORY:  + GOMEZ, Denies Wheezing, cough / sputum, hemoptysis                                                                GI:  Denies Nausea, vommiting, diarrhea, constipation, melena, difficulty swallowing                                               : Denies Hematuria, dysuria, urgency, incontinence                                                                                         MUSKULOSKELETAL:  Denies arthritis, joint swelling, muscle weakness                                                             SKIN/BREAST:  Denies rash, itching, brandee loss, masses                                                                                            PSYCH:  Denies depresion, anxiety, suicidal ideation                                                                                               HEME/LYMPH:  Denies bruises easily, enlarged lymph nodes, tender lymph nodes                                        ENDOCRINE:  Denies cold intolerance, heat intolerance, polydipsia

## 2020-09-25 ENCOUNTER — TRANSCRIPTION ENCOUNTER (OUTPATIENT)
Age: 80
End: 2020-09-25

## 2020-09-25 VITALS — TEMPERATURE: 97 F

## 2020-09-25 PROBLEM — I10 ESSENTIAL (PRIMARY) HYPERTENSION: Chronic | Status: ACTIVE | Noted: 2020-09-24

## 2020-09-25 PROBLEM — I34.0 NONRHEUMATIC MITRAL (VALVE) INSUFFICIENCY: Chronic | Status: ACTIVE | Noted: 2020-09-24

## 2020-09-25 PROBLEM — M10.9 GOUT, UNSPECIFIED: Chronic | Status: ACTIVE | Noted: 2020-09-24

## 2020-09-25 LAB
ALBUMIN SERPL ELPH-MCNC: 3.3 G/DL — SIGNIFICANT CHANGE UP (ref 3.3–5)
ALP SERPL-CCNC: 56 U/L — SIGNIFICANT CHANGE UP (ref 40–120)
ALT FLD-CCNC: 10 U/L — SIGNIFICANT CHANGE UP (ref 10–45)
ANION GAP SERPL CALC-SCNC: 12 MMOL/L — SIGNIFICANT CHANGE UP (ref 5–17)
APTT BLD: 28.1 SEC — SIGNIFICANT CHANGE UP (ref 27.5–35.5)
AST SERPL-CCNC: 21 U/L — SIGNIFICANT CHANGE UP (ref 10–40)
BILIRUB SERPL-MCNC: 0.4 MG/DL — SIGNIFICANT CHANGE UP (ref 0.2–1.2)
BUN SERPL-MCNC: 18 MG/DL — SIGNIFICANT CHANGE UP (ref 7–23)
CALCIUM SERPL-MCNC: 8.6 MG/DL — SIGNIFICANT CHANGE UP (ref 8.4–10.5)
CHLORIDE SERPL-SCNC: 104 MMOL/L — SIGNIFICANT CHANGE UP (ref 96–108)
CO2 SERPL-SCNC: 23 MMOL/L — SIGNIFICANT CHANGE UP (ref 22–31)
CREAT SERPL-MCNC: 0.98 MG/DL — SIGNIFICANT CHANGE UP (ref 0.5–1.3)
GAS PNL BLDA: SIGNIFICANT CHANGE UP
GLUCOSE SERPL-MCNC: 101 MG/DL — HIGH (ref 70–99)
HCT VFR BLD CALC: 29.6 % — LOW (ref 34.5–45)
HGB BLD-MCNC: 9.9 G/DL — LOW (ref 11.5–15.5)
INR BLD: 1.11 — SIGNIFICANT CHANGE UP (ref 0.88–1.16)
LACTATE SERPL-SCNC: 0.7 MMOL/L — SIGNIFICANT CHANGE UP (ref 0.5–2)
MAGNESIUM SERPL-MCNC: 1.9 MG/DL — SIGNIFICANT CHANGE UP (ref 1.6–2.6)
MCHC RBC-ENTMCNC: 32 PG — SIGNIFICANT CHANGE UP (ref 27–34)
MCHC RBC-ENTMCNC: 33.4 GM/DL — SIGNIFICANT CHANGE UP (ref 32–36)
MCV RBC AUTO: 95.8 FL — SIGNIFICANT CHANGE UP (ref 80–100)
NRBC # BLD: 0 /100 WBCS — SIGNIFICANT CHANGE UP (ref 0–0)
PHOSPHATE SERPL-MCNC: 2.9 MG/DL — SIGNIFICANT CHANGE UP (ref 2.5–4.5)
PLATELET # BLD AUTO: 134 K/UL — LOW (ref 150–400)
POTASSIUM SERPL-MCNC: 3.6 MMOL/L — SIGNIFICANT CHANGE UP (ref 3.5–5.3)
POTASSIUM SERPL-SCNC: 3.6 MMOL/L — SIGNIFICANT CHANGE UP (ref 3.5–5.3)
PROT SERPL-MCNC: 5.9 G/DL — LOW (ref 6–8.3)
PROTHROM AB SERPL-ACNC: 13.2 SEC — SIGNIFICANT CHANGE UP (ref 10.6–13.6)
RBC # BLD: 3.09 M/UL — LOW (ref 3.8–5.2)
RBC # FLD: 13.4 % — SIGNIFICANT CHANGE UP (ref 10.3–14.5)
SODIUM SERPL-SCNC: 139 MMOL/L — SIGNIFICANT CHANGE UP (ref 135–145)
WBC # BLD: 9.38 K/UL — SIGNIFICANT CHANGE UP (ref 3.8–10.5)
WBC # FLD AUTO: 9.38 K/UL — SIGNIFICANT CHANGE UP (ref 3.8–10.5)

## 2020-09-25 PROCEDURE — 71045 X-RAY EXAM CHEST 1 VIEW: CPT | Mod: 26

## 2020-09-25 PROCEDURE — 93306 TTE W/DOPPLER COMPLETE: CPT | Mod: 26

## 2020-09-25 RX ORDER — ATENOLOL 25 MG/1
1 TABLET ORAL
Qty: 0 | Refills: 0 | DISCHARGE

## 2020-09-25 RX ORDER — POTASSIUM CHLORIDE 20 MEQ
40 PACKET (EA) ORAL ONCE
Refills: 0 | Status: COMPLETED | OUTPATIENT
Start: 2020-09-25 | End: 2020-09-25

## 2020-09-25 RX ORDER — ALLOPURINOL 300 MG
0 TABLET ORAL
Qty: 0 | Refills: 0 | DISCHARGE

## 2020-09-25 RX ORDER — POTASSIUM CHLORIDE 20 MEQ
20 PACKET (EA) ORAL ONCE
Refills: 0 | Status: COMPLETED | OUTPATIENT
Start: 2020-09-25 | End: 2020-09-25

## 2020-09-25 RX ORDER — FUROSEMIDE 40 MG
20 TABLET ORAL ONCE
Refills: 0 | Status: COMPLETED | OUTPATIENT
Start: 2020-09-25 | End: 2020-09-25

## 2020-09-25 RX ORDER — CLOPIDOGREL BISULFATE 75 MG/1
1 TABLET, FILM COATED ORAL
Qty: 30 | Refills: 0
Start: 2020-09-25 | End: 2020-10-24

## 2020-09-25 RX ORDER — ASPIRIN/CALCIUM CARB/MAGNESIUM 324 MG
1 TABLET ORAL
Qty: 30 | Refills: 0
Start: 2020-09-25 | End: 2020-10-24

## 2020-09-25 RX ORDER — FUROSEMIDE 40 MG
40 TABLET ORAL DAILY
Refills: 0 | Status: DISCONTINUED | OUTPATIENT
Start: 2020-09-25 | End: 2020-09-25

## 2020-09-25 RX ORDER — FUROSEMIDE 40 MG
1 TABLET ORAL
Qty: 0 | Refills: 0 | DISCHARGE

## 2020-09-25 RX ORDER — MAGNESIUM OXIDE 400 MG ORAL TABLET 241.3 MG
400 TABLET ORAL ONCE
Refills: 0 | Status: COMPLETED | OUTPATIENT
Start: 2020-09-25 | End: 2020-09-25

## 2020-09-25 RX ORDER — FUROSEMIDE 40 MG
1 TABLET ORAL
Qty: 30 | Refills: 0
Start: 2020-09-25 | End: 2020-10-24

## 2020-09-25 RX ORDER — ATENOLOL 25 MG/1
1 TABLET ORAL
Qty: 30 | Refills: 0
Start: 2020-09-25 | End: 2020-10-24

## 2020-09-25 RX ORDER — ALLOPURINOL 300 MG
1 TABLET ORAL
Qty: 30 | Refills: 0
Start: 2020-09-25 | End: 2020-10-24

## 2020-09-25 RX ORDER — METOPROLOL TARTRATE 50 MG
12.5 TABLET ORAL EVERY 12 HOURS
Refills: 0 | Status: DISCONTINUED | OUTPATIENT
Start: 2020-09-25 | End: 2020-09-25

## 2020-09-25 RX ADMIN — PANTOPRAZOLE SODIUM 40 MILLIGRAM(S): 20 TABLET, DELAYED RELEASE ORAL at 06:28

## 2020-09-25 RX ADMIN — CLOPIDOGREL BISULFATE 75 MILLIGRAM(S): 75 TABLET, FILM COATED ORAL at 11:26

## 2020-09-25 RX ADMIN — Medication 200 GRAM(S): at 00:30

## 2020-09-25 RX ADMIN — MAGNESIUM OXIDE 400 MG ORAL TABLET 400 MILLIGRAM(S): 241.3 TABLET ORAL at 05:38

## 2020-09-25 RX ADMIN — Medication 100 MILLIGRAM(S): at 11:26

## 2020-09-25 RX ADMIN — HEPARIN SODIUM 5000 UNIT(S): 5000 INJECTION INTRAVENOUS; SUBCUTANEOUS at 05:39

## 2020-09-25 RX ADMIN — Medication 20 MILLIGRAM(S): at 06:45

## 2020-09-25 RX ADMIN — Medication 2000 MILLIGRAM(S): at 08:00

## 2020-09-25 RX ADMIN — Medication 20 MILLIEQUIVALENT(S): at 06:00

## 2020-09-25 RX ADMIN — Medication 12.5 MILLIGRAM(S): at 11:23

## 2020-09-25 RX ADMIN — Medication 40 MILLIEQUIVALENT(S): at 00:30

## 2020-09-25 RX ADMIN — Medication 81 MILLIGRAM(S): at 11:26

## 2020-09-25 NOTE — DISCHARGE NOTE PROVIDER - CARE PROVIDERS DIRECT ADDRESSES
,[email or web address]_Unknown,[email or web address]

## 2020-09-25 NOTE — DISCHARGE NOTE NURSING/CASE MANAGEMENT/SOCIAL WORK - PATIENT PORTAL LINK FT
You can access the FollowMyHealth Patient Portal offered by [facility] by registering at the following website: [email or web address]. By joining [facility] [employer]’s FollowMyHealth portal, you will also be able to view your health information using other applications (apps) compatible with our system.

## 2020-09-25 NOTE — DISCHARGE NOTE PROVIDER - HOSPITAL COURSE
79 y/o female with hx of HTN, gout and chronic diastolic CHF with severe MR who originally presented with persistent cough and intermittent LE edema. She also states he has SOB when walking up the stairs or doing laundry but denies SOB at rest. Recent echo showed echo showed normal LV and RV function, bileaflet mitral valve prolapse involving A1-P1 and A2-Pr with flail A1-A2 scallops, and a cleft between P1-P2. Reely mobile linear echodensity on the atrial side of the anterior mitral leaflet measuring 1cm that likely represents a torn mitral chord with severe MR. Patient was evaluated by Dr. Núñez and was found to be a candidate for mitraclip placement.  She is now POD #1 from a mitraclip.  Doing well, had her post op echo today showing now moderate-severe MR (improved from severe MR).  She is ambulating and tolerating PO diet.  Ready for D/C home.

## 2020-09-25 NOTE — DISCHARGE NOTE PROVIDER - NSDCMRMEDTOKEN_GEN_ALL_CORE_FT
allopurinol 100 mg oral tablet: 1 tab(s) orally once a day   aspirin 81 mg oral delayed release tablet: 1 tab(s) orally once a day  atenolol 25 mg oral tablet: 1 tab(s) orally once a day  clopidogrel 75 mg oral tablet: 1 tab(s) orally once a day  furosemide 40 mg oral tablet: 1 tab(s) orally once a day  Multi Vitamin+ oral liquid:   Vitamin D3:    allopurinol 100 mg oral tablet: 1 tab(s) orally once a day   aspirin 81 mg oral delayed release tablet: 1 tab(s) orally once a day  atenolol 25 mg oral tablet: 1 tab(s) orally once a day  furosemide 40 mg oral tablet: 1 tab(s) orally once a day  Multi Vitamin+ oral liquid:   Vitamin D3:

## 2020-09-25 NOTE — PROGRESS NOTE ADULT - SUBJECTIVE AND OBJECTIVE BOX
Patient discussed on morning rounds with Dr. Núñez    Operation / Date:  L and R heart cath, mitraclip X2    Surgeon: Dr. Núñez    Referring Physician:  Messi Martinez     SUBJECTIVE ASSESSMENT:  79 y/o female with hx of HTN, gout and chronic diastolic CHF with severe MR who originally presented with persistent cough and intermittent LE edema. She also states he has SOB when walking up the stairs or doing laundry but denies SOB at rest. Recent echo showed echo showed normal LV and RV function, bileaflet mitral valve prolapse involving A1-P1 and A2-Pr with flail A1-A2 scallops, and a cleft between P1-P2. Reely mobile linear echodensity on the atrial side of the anterior mitral leaflet measuring 1cm that likely represents a torn mitral chord with severe MR. Patient was evaluated by Dr. Núñez and was found to be a candidate for mitraclip placement.  She is now POD #1 from a mitraclip.  Doing well, had her post op echo today showing now moderate-severe MR (improved from severe MR).  She is ambulating and tolerating PO diet.  Ready for D/C home.     Vital Signs Last 24 Hrs  T(C): 36.1 (25 Sep 2020 09:20), Max: 37.4 (25 Sep 2020 01:01)  T(F): 97 (25 Sep 2020 09:20), Max: 99.3 (25 Sep 2020 01:01)  HR: 77 (25 Sep 2020 08:57) (68 - 92)  BP: 111/55 (25 Sep 2020 08:57) (94/47 - 111/55)  BP(mean): 79 (25 Sep 2020 08:57) (67 - 79)  RR: 20 (25 Sep 2020 08:57) (16 - 23)  SpO2: 97% (25 Sep 2020 08:57) (94% - 100%)  I&O's Detail    24 Sep 2020 07:01  -  25 Sep 2020 07:00  --------------------------------------------------------  IN:    IV PiggyBack: 300 mL    Oral Fluid: 730 mL    sodium chloride 0.9%: 170 mL  Total IN: 1200 mL    OUT:    Voided (mL): 1750 mL  Total OUT: 1750 mL    Total NET: -550 mL      25 Sep 2020 07:01  -  25 Sep 2020 10:54  --------------------------------------------------------  IN:    Oral Fluid: 298 mL    sodium chloride 0.9%: 10 mL  Total IN: 308 mL    OUT:    Voided (mL): 300 mL  Total OUT: 300 mL    Total NET: 8 mL        PHYSICAL EXAM:    GEN: NAD, looks comfortable.  Looks well.  Sitting up in the chair   Psych: Mood appropriate  Neuro: A&Ox3.  No focal deficits.  Moving all extremities.   HEENT: No obvious abnormalities  CV: S1S2, regular, no murmurs appreciated.  No carotid bruits.  No JVD  Lungs: Clear B/L.  No wheezing, rales or rhonchi  ABD: Soft, non-tender, non-distended.  +Bowel sounds  EXT: Warm and well perfused.  No peripheral edema noted.  **RT groin stitch REMOVED**  Musculoskeletal: Moving all extremities with normal ROM, no joint swelling  PV: Pedal pulses palpable      LABS:                        9.9    9.38  )-----------( 134      ( 25 Sep 2020 03:12 )             29.6       COUMADIN:  No    PT/INR - ( 25 Sep 2020 03:12 )   PT: 13.2 sec;   INR: 1.11          PTT - ( 25 Sep 2020 03:12 )  PTT:28.1 sec    09-25    139  |  104  |  18  ----------------------------<  101<H>  3.6   |  23  |  0.98    Ca    8.6      25 Sep 2020 03:12  Phos  2.9     09-25  Mg     1.9     09-25    TPro  5.9<L>  /  Alb  3.3  /  TBili  0.4  /  DBili  x   /  AST  21  /  ALT  10  /  AlkPhos  56  09-25          MEDICATIONS  (STANDING):  allopurinol 100 milliGRAM(s) Oral daily  aspirin enteric coated 81 milliGRAM(s) Oral daily  ceFAZolin  Injectable. 2000 milliGRAM(s) IV Push every 8 hours  clopidogrel Tablet 75 milliGRAM(s) Oral daily  furosemide    Tablet 40 milliGRAM(s) Oral daily  heparin   Injectable 5000 Unit(s) SubCutaneous every 8 hours  influenza  Vaccine (HIGH DOSE) 0.7 milliLiter(s) IntraMuscular once  metoprolol tartrate 12.5 milliGRAM(s) Oral every 12 hours  pantoprazole    Tablet 40 milliGRAM(s) Oral before breakfast  sodium chloride 0.9%. 1000 milliLiter(s) (10 mL/Hr) IV Continuous <Continuous>      Discharge CXR: x< from: Xray Chest 1 View- PORTABLE-Routine (Xray Chest 1 View- PORTABLE-Routine in AM.) (09.25.20 @ 03:49) >  Findings/  impression: Stablecardiomegaly, cari clips. No acute focal opacity. Stable bony structures. Increased elevation of the right hemidiaphragm.    < end of copied text >    < from: 12 Lead ECG (09.24.20 @ 12:05) >  Ventricular Rate 72 BPM    Atrial Rate 72 BPM    P-R Interval 172 ms    QRS Duration 98 ms    Q-T Interval 442 ms    QTC Calculation(Bazett) 483 ms    P Axis 72 degrees    R Axis -52 degrees    T Axis 26 degrees    Diagnosis Line Normal sinus rhythm  Nonspecific ST - T abnormalities    < end of copied text >    Discharge ECHO:     e< from: TTE Echo Complete w/o Contrast w/ Doppler (09.25.20 @ 09:48) >   1. Severely dilated left atrium.   2. Color flow Doppler reveals evidence of an interatrial shunt.   3. The mitral valve is mildly thickened. Two mitraclips in place at A2-P2 and slightly A1-P1 position. Lateral orifice is not well visualized on short axis view. Mitral annular calcification noted. The mean transvalvular gradient is 5.00 mmHg at a heart rate of 74 bpm. There is moderate-to-severe eccentric mitralregurgitation seen at a blood pressure of 132/63 mmHg. Residual regurgitation appears to be originating from the lateral orifice.   4. There is mild tricuspid regurgitation. Pulmonary artery systolic pressure (estimated using the tricuspid regurgitant gradient and an estimate of right atrial pressure) is 53 mmHg.   5. Normal left and right ventricular size and systolic function.   6. Trivial pericardial effusion.    < end of copied text >

## 2020-09-25 NOTE — DISCHARGE NOTE PROVIDER - NSDCCPCAREPLAN_GEN_ALL_CORE_FT
PRINCIPAL DISCHARGE DIAGNOSIS  Diagnosis: Mitral regurgitation  Assessment and Plan of Treatment:

## 2020-09-25 NOTE — DISCHARGE NOTE PROVIDER - CARE PROVIDER_API CALL
Sam Núñez  CARDIOVASCULAR SURGERY  [address] [place] [address]  Phone: [phone]  Fax: [phone]  Follow Up Time:     Messi Thomas  CARDIOLOGY  [facility] [place] [address]  Phone: [phone]  Fax: [phone]  Follow Up Time:

## 2020-09-28 PROCEDURE — 85025 COMPLETE CBC W/AUTO DIFF WBC: CPT

## 2020-09-28 PROCEDURE — 82330 ASSAY OF CALCIUM: CPT

## 2020-09-28 PROCEDURE — 71045 X-RAY EXAM CHEST 1 VIEW: CPT

## 2020-09-28 PROCEDURE — C1887: CPT

## 2020-09-28 PROCEDURE — 86901 BLOOD TYPING SEROLOGIC RH(D): CPT

## 2020-09-28 PROCEDURE — 85730 THROMBOPLASTIN TIME PARTIAL: CPT

## 2020-09-28 PROCEDURE — 84132 ASSAY OF SERUM POTASSIUM: CPT

## 2020-09-28 PROCEDURE — 84295 ASSAY OF SERUM SODIUM: CPT

## 2020-09-28 PROCEDURE — 85027 COMPLETE CBC AUTOMATED: CPT

## 2020-09-28 PROCEDURE — C1889: CPT

## 2020-09-28 PROCEDURE — 36415 COLL VENOUS BLD VENIPUNCTURE: CPT

## 2020-09-28 PROCEDURE — 82803 BLOOD GASES ANY COMBINATION: CPT

## 2020-09-28 PROCEDURE — 83735 ASSAY OF MAGNESIUM: CPT

## 2020-09-28 PROCEDURE — 86850 RBC ANTIBODY SCREEN: CPT

## 2020-09-28 PROCEDURE — 80053 COMPREHEN METABOLIC PANEL: CPT

## 2020-09-28 PROCEDURE — C1894: CPT

## 2020-09-28 PROCEDURE — 86900 BLOOD TYPING SEROLOGIC ABO: CPT

## 2020-09-28 PROCEDURE — 93005 ELECTROCARDIOGRAM TRACING: CPT

## 2020-09-28 PROCEDURE — C1769: CPT

## 2020-09-28 PROCEDURE — 93306 TTE W/DOPPLER COMPLETE: CPT

## 2020-09-28 PROCEDURE — 85610 PROTHROMBIN TIME: CPT

## 2020-09-28 PROCEDURE — 86923 COMPATIBILITY TEST ELECTRIC: CPT

## 2020-09-28 PROCEDURE — 84100 ASSAY OF PHOSPHORUS: CPT

## 2020-09-28 PROCEDURE — 93312 ECHO TRANSESOPHAGEAL: CPT

## 2020-09-28 PROCEDURE — 83605 ASSAY OF LACTIC ACID: CPT

## 2020-09-28 PROCEDURE — C1760: CPT

## 2020-09-30 DIAGNOSIS — I34.0 NONRHEUMATIC MITRAL (VALVE) INSUFFICIENCY: ICD-10-CM

## 2020-09-30 DIAGNOSIS — I50.32 CHRONIC DIASTOLIC (CONGESTIVE) HEART FAILURE: ICD-10-CM

## 2020-09-30 DIAGNOSIS — M10.9 GOUT, UNSPECIFIED: ICD-10-CM

## 2020-09-30 DIAGNOSIS — I51.1 RUPTURE OF CHORDAE TENDINEAE, NOT ELSEWHERE CLASSIFIED: ICD-10-CM

## 2020-09-30 DIAGNOSIS — Z00.6 ENCOUNTER FOR EXAMINATION FOR NORMAL COMPARISON AND CONTROL IN CLINICAL RESEARCH PROGRAM: ICD-10-CM

## 2020-09-30 DIAGNOSIS — I34.1 NONRHEUMATIC MITRAL (VALVE) PROLAPSE: ICD-10-CM

## 2020-09-30 DIAGNOSIS — I11.0 HYPERTENSIVE HEART DISEASE WITH HEART FAILURE: ICD-10-CM

## 2020-09-30 DIAGNOSIS — I25.10 ATHEROSCLEROTIC HEART DISEASE OF NATIVE CORONARY ARTERY WITHOUT ANGINA PECTORIS: ICD-10-CM

## 2020-10-09 ENCOUNTER — APPOINTMENT (OUTPATIENT)
Dept: CARDIOTHORACIC SURGERY | Facility: CLINIC | Age: 80
End: 2020-10-09
Payer: MEDICARE

## 2020-10-09 PROCEDURE — 99024 POSTOP FOLLOW-UP VISIT: CPT

## 2020-10-13 VITALS
HEART RATE: 74 BPM | OXYGEN SATURATION: 97 % | TEMPERATURE: 97.5 F | WEIGHT: 146 LBS | BODY MASS INDEX: 29.49 KG/M2 | DIASTOLIC BLOOD PRESSURE: 70 MMHG | SYSTOLIC BLOOD PRESSURE: 120 MMHG

## 2020-11-03 ENCOUNTER — RESULT REVIEW (OUTPATIENT)
Age: 80
End: 2020-11-03

## 2020-11-05 NOTE — REVIEW OF SYSTEMS
[Feeling Tired] : feeling tired [Negative] : Psychiatric [Fever] : no fever [Chills] : no chills [Feeling Poorly] : not feeling poorly [Heart Rate Is Slow] : the heart rate was not slow [Heart Rate Is Fast] : the heart rate was not fast [Chest Pain] : no chest pain [Palpitations] : no palpitations [Leg Claudication] : no intermittent leg claudication [Lower Ext Edema] : no lower extremity edema [Shortness Of Breath] : no shortness of breath [Wheezing] : no wheezing [Cough] : no cough [SOB on Exertion] : no shortness of breath during exertion [Orthopnea] : no orthopnea [PND] : no PND

## 2020-11-05 NOTE — PHYSICAL EXAM
[Examination Of The Chest] : the chest was normal in appearance [Chest Visual Inspection Thoracic Asymmetry] : no chest asymmetry [Diminished Respiratory Excursion] : normal chest expansion [Murmurs] : no murmurs [Neck Appearance] : the appearance of the neck was normal [Jugular Venous Distention Increased] : there was no jugular-venous distention [] : no respiratory distress [Respiration, Rhythm And Depth] : normal respiratory rhythm and effort [Exaggerated Use Of Accessory Muscles For Inspiration] : no accessory muscle use [Auscultation Breath Sounds / Voice Sounds] : lungs were clear to auscultation bilaterally [Apical Impulse] : the apical impulse was normal [Heart Rate And Rhythm] : heart rate was normal and rhythm regular [Heart Sounds] : normal S1 and S2 [Bowel Sounds] : normal bowel sounds [Abdomen Soft] : soft [Abdomen Tenderness] : non-tender [Abnormal Walk] : normal gait [Skin Color & Pigmentation] : normal skin color and pigmentation [No Focal Deficits] : no focal deficits [Oriented To Time, Place, And Person] : oriented to person, place, and time [General Appearance - Alert] : alert [General Appearance - In No Acute Distress] : in no acute distress

## 2020-11-05 NOTE — HISTORY OF PRESENT ILLNESS
[FreeTextEntry1] : 80 year old female with a history of HTN, gout and chronic diastolic heart failure with severe mitral regurgitation s/p MitraClip on 9/24/20 who present for follow up after discharge.\par \par The patient tolerated the procedure well and was discharged home on POD #1. Since discharge, the patient states she is feeling well. She reports mild SOB with exertion; denies SOB at rest. The patient also denies chest pain, orthopnea, PND, dizziness, syncope, LE edema and palpitations. \par \par The patient lives at home by herself. She remains independent in her ADLs. Her daughter lives close by.

## 2020-11-05 NOTE — REASON FOR VISIT
[Follow-Up: _____] : a [unfilled] follow-up visit [Follow-Up - Clinic] : a clinic follow-up of [FreeTextEntry1] : mitral regurgitation s/p MitraClip.

## 2020-11-06 ENCOUNTER — NON-APPOINTMENT (OUTPATIENT)
Age: 80
End: 2020-11-06

## 2020-11-06 ENCOUNTER — APPOINTMENT (OUTPATIENT)
Dept: CARDIOTHORACIC SURGERY | Facility: CLINIC | Age: 80
End: 2020-11-06
Payer: MEDICARE

## 2020-11-06 VITALS
HEART RATE: 72 BPM | BODY MASS INDEX: 29.64 KG/M2 | HEIGHT: 59 IN | DIASTOLIC BLOOD PRESSURE: 64 MMHG | SYSTOLIC BLOOD PRESSURE: 106 MMHG | WEIGHT: 147 LBS

## 2020-11-06 PROCEDURE — 36415 COLL VENOUS BLD VENIPUNCTURE: CPT

## 2020-11-06 PROCEDURE — 99024 POSTOP FOLLOW-UP VISIT: CPT

## 2020-11-06 PROCEDURE — 93000 ELECTROCARDIOGRAM COMPLETE: CPT

## 2020-11-11 NOTE — REVIEW OF SYSTEMS
[SOB on Exertion] : shortness of breath during exertion [Fever] : no fever [Chills] : no chills [Feeling Poorly] : not feeling poorly [Feeling Tired] : not feeling tired [Heart Rate Is Slow] : the heart rate was not slow [Heart Rate Is Fast] : the heart rate was not fast [Chest Pain] : no chest pain [Palpitations] : no palpitations [Leg Claudication] : no intermittent leg claudication [Lower Ext Edema] : no lower extremity edema [Shortness Of Breath] : no shortness of breath [Wheezing] : no wheezing [Cough] : no cough [Orthopnea] : no orthopnea [PND] : no PND

## 2020-11-11 NOTE — HISTORY OF PRESENT ILLNESS
[FreeTextEntry1] : \par 80 year old female with a history of HTN, gout and chronic diastolic heart failure with severe mitral regurgitation s/p MitraClip on 9/24/20 who present for one month follow up. \par \par Since her last visit, the patient has been feeling well. She has been ambulating around her home with rare shortness of breath. She denies SOB at rest. In addition, the patient denies chest pain, orthopnea, PND, dizziness, syncope, LE edema and palpitations. \par \par The patient lives at home by herself. She remains independent in her ADLs. Her daughter lives close by.

## 2021-04-19 ENCOUNTER — APPOINTMENT (OUTPATIENT)
Dept: CARDIOTHORACIC SURGERY | Facility: CLINIC | Age: 81
End: 2021-04-19
Payer: MEDICARE

## 2021-04-19 PROCEDURE — 99213 OFFICE O/P EST LOW 20 MIN: CPT | Mod: 95

## 2021-04-20 NOTE — HISTORY OF PRESENT ILLNESS
[FreeTextEntry1] : \par This visit was provided via telehealth using real-time 2-way audio visual technology. The patient, JOSE MERRITT, was located at home, [address] [place] [address] , at the time of the visit. The provider was located at [address] [place] [address]. The patient, JOSE MERRITT, Dr. Amarjit Sweet and BOOKER AUGUST all participated in the telehealth encounter. Verbal consent given on 04/19/2021 by JOSE SHAINA.\par \par \par 80 year old female with a history of HTN, gout and chronic diastolic heart failure with severe mitral regurgitation s/p MitraClip on 9/24/20 who present for follow up. \par \par Since her last visit, the patient is feeling well and breathing has improved. She denies any SOB at rest or with exertion, chest pain, palpitations, dizziness, syncope, or LE edema.

## 2021-04-20 NOTE — REVIEW OF SYSTEMS
[Negative] : Psychiatric [Fever] : no fever [Chills] : no chills [Feeling Poorly] : not feeling poorly [Feeling Tired] : not feeling tired [Heart Rate Is Slow] : the heart rate was not slow [Heart Rate Is Fast] : the heart rate was not fast [Chest Pain] : no chest pain [Palpitations] : no palpitations [Leg Claudication] : no intermittent leg claudication [Lower Ext Edema] : no lower extremity edema [Shortness Of Breath] : no shortness of breath [Wheezing] : no wheezing [Cough] : no cough [SOB on Exertion] : no shortness of breath during exertion [Orthopnea] : no orthopnea [PND] : no PND

## 2021-04-20 NOTE — HISTORY OF PRESENT ILLNESS
[FreeTextEntry1] : \par This visit was provided via telehealth using real-time 2-way audio visual technology. The patient, JOSE MERRITT, was located at home, [address] [place] [address] , at the time of the visit. The provider was located at [address] [place] [address]. The patient, JOSE MERRITT, Dr. Amarjit Sweet and BOOKER AUGUST all participated in the telehealth encounter. Verbal consent given on 04/19/2021 by JOSE SHAINA.\par \par \par 80 year old female with a history of HTN, gout and chronic diastolic heart failure with severe mitral regurgitation s/p MitraClip on 9/24/20 who present for follow up. \par \par Since her last visit, the patient is feeling well and breathing has improved. She denies any SOB at rest or with exertion, chest pain, palpitations, dizziness, syncope, or LE edema.

## 2021-05-13 NOTE — H&P ADULT - NSHPLABSRESULTS_GEN_ALL_CORE
[place] [address]  Dept: [phone]  Dept Fax: [phone]  Loc: [phone]     Gloria Eddy is a 65 y.o. male who presents today for his medical conditions/complaintsas noted below. Gloria Eddy is c/o of   Chief Complaint   Patient presents with    Hypertension     3 month    Hyperlipidemia    Diabetes         HPI:     Hypertension  This is a chronic (essential htn) problem. The current episode started more than 1 year ago. The problem has been waxing and waning since onset. The problem is controlled. Pertinent negatives include no chest pain, headaches, neck pain, palpitations or shortness of breath. Hyperlipidemia  This is a chronic problem. The current episode started more than 1 year ago. The problem is controlled. Recent lipid tests were reviewed and are variable. Pertinent negatives include no chest pain or shortness of breath. Diabetes  He presents for his follow-up diabetic visit. He has type 2 diabetes mellitus. His disease course has been fluctuating. Pertinent negatives for hypoglycemia include no confusion, dizziness, headaches, nervousness/anxiousness or pallor. Pertinent negatives for diabetes include no chest pain, no polydipsia, no polyuria and no weakness. Other  This is a chronic (4-malignant neoplasm of urinary bladder, unspecified site,5- AAA without rupture,  6-morbidly obese) problem. The current episode started more than 1 month ago. The problem occurs constantly. The problem has been waxing and waning. Pertinent negatives include no abdominal pain, arthralgias, chest pain, chills, coughing, fever, headaches, nausea, neck pain, numbness, rash, vomiting or weakness. Hemoglobin A1C (%)   Date Value   05/04/2021 6.2 (H)   07/21/2020 6.1 (H)   04/15/2020 6.1 (H)            Microalb/Crt.  Ratio (mcg/mg creat)   Date Value   07/21/2020 CANNOT BE CALCULATED LDL Cholesterol (mg/dL)   Date Value   01/17/2020 95   10/31/2017 95   10/27/2016 102     LDL Calculated (mg/dL)   Date Value   06/28/2019 76.4   06/28/2019 76.4   06/28/2018 86.0         AST (U/L)   Date Value   01/17/2020 10     ALT (U/L)   Date Value   10/31/2017 15     BUN (mg/dL)   Date Value   05/04/2021 37 (H)     BP Readings from Last 3 Encounters:   05/13/21 132/80   02/04/21 126/82   11/09/20 122/64              Past Medical History:   Diagnosis Date    Abdominal aortic aneurysm (HCC)     status post endograft 05/12/2010    Angina pectoris (HCC)     stable    Arthritis     Arthritis of carpometacarpal joint     right first    Benign prostatic hypertrophy     CAD (coronary artery disease)     Coronary heart disease     post coronary artery bypass graft    Diabetes mellitus ([address]     Diabetes mellitus, type 2 ([address]     Headache(784.0)     possibly related to nitrates    Hyperlipidemia     Hypertension     Left ventricular dysfunction     ejection fraction 40 to 45%    Malignant neoplasm of urinary bladder ([address] 5/13/2021    Obesity     Rotator cuff syndrome of left shoulder     Spinal stenosis     worse at L4-L5    Thrombus     in left iliac limb of aortic stent graft    Tobacco abuse       Past Surgical History:   Procedure Laterality Date    ABDOMINAL AORTIC ANEURYSM REPAIR  2010    5  STENTS PLACED    ABSCESS DRAINAGE      rectal    BACK SURGERY  8/1/2014    L3- S1 decompression    CARDIAC CATHETERIZATION  01/2005    showing total occlusion of vein graft to obtuse marginal with collateral filling, patent vein graft to the diagonal, patent vein graft to the posterolateral branch of RCA, patent vein graft to posterior descending branch of RCA with diffuse disease, patent LIMA to LAD, mild LV systolic dysfunction secondary to ischemic cardiomyopathy, status post anterior infarction in [address]    5 BYPASSES    COLONOSCOPY      COLONOSCOPY  02/2009    mild (CORICIDIN HBP FLU PO) Take by mouth as needed (COLD)       No current facility-administered medications for this visit. Allergies   Allergen Reactions    Diclofenac Other (See Comments)     urticaria    Zocor [Simvastatin] Other (See Comments)     Patient unable to recall       Health Maintenance   Topic Date Due    Hepatitis C screen  Never done    Lipid screen  01/17/2021    Annual Wellness Visit (AWV)  02/05/2022    Potassium monitoring  05/04/2022    Creatinine monitoring  05/04/2022    DTaP/Tdap/Td vaccine (4 - Td) 05/26/2027    Flu vaccine  Completed    Shingles Vaccine  Completed    Pneumococcal 65+ years Vaccine  Completed    COVID-19 Vaccine  Completed    Hepatitis A vaccine  Aged Out    Hib vaccine  Aged Out    Meningococcal (ACWY) vaccine  Aged Out       Subjective:      Review of Systems   Constitutional: Negative for chills and fever. HENT: Negative for hearing loss. Eyes: Negative for pain and visual disturbance. Respiratory: Negative for cough and shortness of breath. Cardiovascular: Negative for chest pain, palpitations and leg swelling. Gastrointestinal: Negative for abdominal pain, blood in stool, constipation, diarrhea, nausea and vomiting. Endocrine: Negative for cold intolerance, polydipsia and polyuria. Genitourinary: Negative for difficulty urinating, dysuria and hematuria. Musculoskeletal: Negative for arthralgias, back pain, gait problem and neck pain. Skin: Negative for pallor and rash. Neurological: Negative for dizziness, weakness, numbness and headaches. Hematological: Negative for adenopathy. Does not bruise/bleed easily. Psychiatric/Behavioral: Negative for confusion. The patient is not nervous/anxious. Objective:     Physical Exam  Vitals signs reviewed. Constitutional:       Appearance: He is well-developed. HENT:      Head: Normocephalic and atraumatic. Eyes:      Pupils: Pupils are equal, round, and reactive to light. Neck:      Musculoskeletal: Neck supple. Cardiovascular:      Rate and Rhythm: Normal rate and regular rhythm. Heart sounds: No murmur. No friction rub. No gallop. Pulmonary:      Effort: Pulmonary effort is normal.      Breath sounds: Normal breath sounds. No wheezing or rales. Abdominal:      General: There is no distension. Palpations: Abdomen is soft. There is no mass. Tenderness: There is no abdominal tenderness. There is no rebound. Musculoskeletal: Normal range of motion. Lymphadenopathy:      Cervical: No cervical adenopathy. Skin:     General: Skin is warm and dry. Findings: No rash. Neurological:      Mental Status: He is alert and oriented to person, place, and time. Cranial Nerves: No cranial nerve deficit (grossly). Psychiatric:         Thought Content: Thought content normal.        /80 (Site: Right Upper Arm, Position: Sitting, Cuff Size: Large Adult)   Pulse 58   Resp 16   Ht 6' (1.829 m)   Wt 295 lb (133.8 kg)   BMI 40.01 kg/m²     Assessment:       Diagnosis Orders   1. Essential hypertension  Basic Metabolic Panel   2. Abdominal aortic aneurysm (AAA) without rupture ([address]     3. Type 2 diabetes mellitus with diabetic neuropathy, without long-term current use of insulin (HCC)  Hemoglobin A1C    Microalbumin, Ur   4. Other hyperlipidemia     5. Malignant neoplasm of urinary bladder, unspecified site ([address]     6. Morbidly obese ([address]     7. Anemia, unspecified type  Hemoglobin      Reviewed multiple test results for this appointment. 5/4/2021 better hemoglobin equal 12.4.    5/4/2021 stable creatinine 2.60 (was 2.72).    5/4/2021 okay hemoglobin A1c at 6.2. Patient told to continue Lipitor, Lasix, etc.       Plan:       Return in about 3 months (around 8/13/2021) for Hypertension, Hyperlipidemia, Diabetes.     Orders Placed This Encounter   Procedures    Basic Metabolic Panel     Standing Status:   Future     Standing Expiration Date: 5/13/2022    Hemoglobin     Standing Status:   Future     Standing Expiration Date:   5/13/2022    Hemoglobin A1C     Standing Status:   Future     Standing Expiration Date:   5/13/2022    Microalbumin, Ur     Standing Status:   Future     Standing Expiration Date:   5/13/2022     No orders of the defined types were placed in this encounter. Patientgiven educational materials - see patient instructions. Discussed use, benefit,and side effects of prescribed medications. All patient questions answered. Ptvoiced understanding. Reviewed health maintenance. Instructed to continue currentmedications, diet and exercise. Patient agreed with treatment plan. Follow up asdirected.      Electronically signed by Shawn Choudhury MD on 5/13/2021 at 1:48 PM Echo   1. Normal left and right ventricular size and systolic function.   2. No LA/RA/RANDALL/RAA thrombus seen.   3. Intracardiac shunt is present; most consistent with a small patent foramen ovale.   4. There is bileaflet mitral valve prolapse involving A1-P1 and A2-P2 with flail of A1-A2 scallops, and a cleft between P1-P2.   5. Freely mobile linear echodensity on the atrial side of anterior mitral leaflet measuring   1 cm that likely represents a torn mitral chord, though cannot exclude vegetation. Recommend clinical correlation.   6. Severe mitral regurgitation.   7. Moderate tricuspid regurgitation.   8. Mild aortic regurgitation.   9. Mild pulmonic regurgitation.

## 2021-09-17 ENCOUNTER — APPOINTMENT (OUTPATIENT)
Dept: CARDIOTHORACIC SURGERY | Facility: CLINIC | Age: 81
End: 2021-09-17
Payer: MEDICARE

## 2021-09-17 VITALS — SYSTOLIC BLOOD PRESSURE: 135 MMHG | OXYGEN SATURATION: 100 % | DIASTOLIC BLOOD PRESSURE: 77 MMHG | HEART RATE: 65 BPM

## 2021-09-17 PROCEDURE — 99213 OFFICE O/P EST LOW 20 MIN: CPT

## 2021-09-20 NOTE — HISTORY OF PRESENT ILLNESS
[FreeTextEntry1] : \par 81 year old female with a history of HTN, gout and chronic diastolic heart failure with severe mitral regurgitation s/p MitraClip on 9/24/20 who presents for one year follow up. \par \par Since her last visit, the patient is feeling well. She is able to walk up and down the stairs without shortness of breath. She does admit to some fatigue with walking. The patient denies SOB at rest, chest pain, palpitations, presyncope/syncope. Has in the remote past used 3d course of q12 lasix for /mild cough, but hasn’t used for months. Otherwise feels well.

## 2021-09-20 NOTE — REASON FOR VISIT
[Follow-Up - Clinic] : a clinic follow-up of [FreeTextEntry1] : mitral regurgitation s/p MitraClip.

## 2021-09-20 NOTE — ASSESSMENT
[FreeTextEntry1] : TTE 8/25/21: mod dilated LA/RA, mildly dilated RV, nml LV, peak/mean MV gradient 23/7mmHg, mod-severe MR with mitraclip x2 in place (unchanged from prior post-procedure), moderate TR.\par \par 80 year old female with a history of HTN, gout and chronic diastolic heart failure with severe mitral regurgitation s/p MitraClip on 9/24/20 who present for 1yr follow up.  The patient is clinically stable; NYHA Class I-II. The patient continues to do well since her last visit and continues to be satisfied with her quality of life since the procedure. Given her good quality of life, we will continue with medical management of her residual mitral regurgitation. \par \par I personally performed the services described in the documentation and reviewed the documented recorded by the scribe in my presence; it accurately and completely records my words and actions.\par \par \par

## 2022-03-11 ENCOUNTER — APPOINTMENT (OUTPATIENT)
Dept: CARDIOTHORACIC SURGERY | Facility: CLINIC | Age: 82
End: 2022-03-11
Payer: MEDICARE

## 2022-03-11 VITALS
SYSTOLIC BLOOD PRESSURE: 125 MMHG | TEMPERATURE: 97.8 F | HEIGHT: 59 IN | HEART RATE: 65 BPM | OXYGEN SATURATION: 93 % | WEIGHT: 154 LBS | DIASTOLIC BLOOD PRESSURE: 85 MMHG | BODY MASS INDEX: 31.04 KG/M2

## 2022-03-11 PROCEDURE — 99214 OFFICE O/P EST MOD 30 MIN: CPT

## 2022-03-11 RX ORDER — ATENOLOL 50 MG/1
50 TABLET ORAL
Refills: 0 | Status: COMPLETED | COMMUNITY

## 2022-03-11 RX ORDER — LEVOTHYROXINE SODIUM 0.05 MG/1
50 TABLET ORAL
Refills: 0 | Status: COMPLETED | COMMUNITY

## 2022-03-11 RX ORDER — FUROSEMIDE 40 MG/1
40 TABLET ORAL DAILY
Qty: 60 | Refills: 3 | Status: DISCONTINUED | COMMUNITY

## 2022-03-16 NOTE — HISTORY OF PRESENT ILLNESS
[FreeTextEntry1] : 81 year old female with a history of HTN, gout and chronic diastolic heart failure with severe mitral regurgitation s/p MitraClip on 9/24/20 who presents for follow up for her valvular disease. \par \par ECHO, 8/25/21: EF 65%. Mild AI. Moderate to severe MR with mean gradient 7 mmHg. Moderate TR. \par \par Since her last visit, the patient reports worsening fatigue, shortness of breath and LE swelling. She now has 3+ b/l LE edema. \par \par

## 2022-03-16 NOTE — END OF VISIT
[FreeTextEntry3] : \par I, IMELDA SANTAMARIA , am scribing for and in the presence of MARIE ESPINOZA the following sections: History of present illness, past Medical/family/surgical/family/social history, review of systems, vital signs, physical exam and disposition.\par

## 2022-03-16 NOTE — CONSULT LETTER
[FreeTextEntry2] : Messi Martinez MD\par [address],  [address]\par [place] [address]\par  [FreeTextEntry1] : Please find attached our consultation on your patient, Ms. JOSE MERRITT . \par \par We take a multidisciplinary team approach to patient care and consider you, the referring physician, an extension of our team. We will maintain an open line of communication with you throughout your patient's treatment course.  \par \par It is our commitment to provide your patient with the highest quality of advanced therapeutic options. We thank you for allowing us to participate in the care of your patient.\par \par Please do not hesitate to contact our team with any questions or concerns at [phone]. \par \par  [FreeTextEntry3] : \par Sam Núñez MD \par [facility] \par Associate Professor of Surgery \par [facility] [employer] at [facility]\par

## 2022-03-16 NOTE — ASSESSMENT
[FreeTextEntry1] : 81 year old female with a history of HTN, gout and chronic diastolic heart failure with severe mitral regurgitation s/p MitraClip on 9/24/20 who presents for follow up for her valvular disease. NYHA Class III. \par \par ECHO, 8/25/21: EF 65%. Mild AI. Moderate to severe MR with mean gradient 7 mmHg. Moderate TR. \par \par I have reviewed the patient's medical records, diagnostic images during the time of this office consultation and have made the following recommendation. Given her symptoms of shortness of breath, NYHA Class III and severe mitral regurgitation, she meets indications for surgery. I had a lengthy discussion with the patient regarding her valvular disease and progression. I have recommended that the patient is a candidate for mitral valve replacement. \par \par Plan: \par -structural cta and coronaries. will do telehealth/telephone to discuss results and to finalize surgical planning. \par -date of surgery for end of March. \par -possible cath; admit for preop workups and heart failure management. \par -covid test 3 days prior to admission.\par -continue current medication regimen. \par -f/u with Dr. Thomas. \par \par \par

## 2022-03-16 NOTE — REVIEW OF SYSTEMS
[Fever] : no fever [Chills] : no chills [Feeling Poorly] : not feeling poorly [Feeling Tired] : not feeling tired [Heart Rate Is Slow] : the heart rate was not slow [Heart Rate Is Fast] : the heart rate was not fast [Chest Pain] : no chest pain [Palpitations] : no palpitations [Leg Claudication] : no intermittent leg claudication [Lower Ext Edema] : no lower extremity edema [Shortness Of Breath] : no shortness of breath [Wheezing] : no wheezing [Cough] : no cough [SOB on Exertion] : no shortness of breath during exertion [Orthopnea] : no orthopnea [PND] : no PND

## 2022-03-17 ENCOUNTER — RESULT REVIEW (OUTPATIENT)
Age: 82
End: 2022-03-17

## 2022-03-21 ENCOUNTER — APPOINTMENT (OUTPATIENT)
Dept: CARDIOTHORACIC SURGERY | Facility: CLINIC | Age: 82
End: 2022-03-21
Payer: MEDICARE

## 2022-03-21 PROCEDURE — 99442: CPT | Mod: 95

## 2022-03-21 NOTE — REASON FOR VISIT
[Home] : at home, [unfilled] , at the time of the visit. [Medical Office: ([facility])___] : at the medical office located in  [Family Member] : family member [Verbal consent obtained from patient] : the patient, [unfilled] [Follow-Up: _____] : a [unfilled] follow-up visit

## 2022-03-21 NOTE — REVIEW OF SYSTEMS
[Feeling Tired] : feeling tired [Lower Ext Edema] : lower extremity edema [SOB on Exertion] : shortness of breath during exertion [Negative] : Heme/Lymph

## 2022-03-30 ENCOUNTER — APPOINTMENT (OUTPATIENT)
Dept: CARDIOTHORACIC SURGERY | Facility: HOSPITAL | Age: 82
End: 2022-03-30

## 2022-03-30 NOTE — ASSESSMENT
[FreeTextEntry1] : 81 year old female with a history of HTN, gout and chronic diastolic heart failure with severe mitral regurgitation s/p MitraClip on 9/24/20 who presents for follow up for her valvular disease. \par \par I have reviewed the patient's medical records, diagnostic images during the time of this office consultation and have made the following recommendation. I have discussed the concerns of calcifications on the mitral valve annulus noted on the structural CTA. I have explained to the patient and her daughter that the calcifications increases her surgical risk significantly. I will discuss and review the imaging with the multi- interdisciplinary team as well as other cardiac surgeons. I will contact the patient in 1-2 days to finalize surgical plan.\par \par \par

## 2022-03-30 NOTE — END OF VISIT
[Time Spent: ___ minutes] : I have spent [unfilled] minutes of time on the encounter. [FreeTextEntry3] : \par I, IMELDA SANTAMARIA , am scribing for and in the presence of MARIE ESPINOZA the following sections: History of present illness, past Medical/family/surgical/family/social history, review of systems, vital signs, physical exam and disposition.\par

## 2022-03-30 NOTE — HISTORY OF PRESENT ILLNESS
[FreeTextEntry1] : 81 year old female with a history of HTN, gout and chronic diastolic heart failure with severe mitral regurgitation s/p MitraClip on 9/24/20 who presents for follow up for her valvular disease. \par \par ECHO, 8/25/21: EF 65%. Mild AI. Moderate to severe MR with mean gradient 7 mmHg. Moderate TR. \par \par The patient reports worsening fatigue, shortness of breath and LE swelling. She now has 3+ b/l LE edema. She was evaluated by Dr. Núñez on 3/11 and was recommended for structural CTA for further surgical planning. \par \par \par \par

## 2022-04-24 ENCOUNTER — FORM ENCOUNTER (OUTPATIENT)
Age: 82
End: 2022-04-24

## 2022-04-25 ENCOUNTER — APPOINTMENT (OUTPATIENT)
Dept: CARDIOTHORACIC SURGERY | Facility: CLINIC | Age: 82
End: 2022-04-25
Payer: MEDICARE

## 2022-04-25 ENCOUNTER — OUTPATIENT (OUTPATIENT)
Dept: OUTPATIENT SERVICES | Facility: HOSPITAL | Age: 82
LOS: 1 days | End: 2022-04-25
Payer: MEDICARE

## 2022-04-25 ENCOUNTER — NON-APPOINTMENT (OUTPATIENT)
Age: 82
End: 2022-04-25

## 2022-04-25 VITALS
WEIGHT: 145 LBS | SYSTOLIC BLOOD PRESSURE: 127 MMHG | BODY MASS INDEX: 29.23 KG/M2 | HEART RATE: 69 BPM | DIASTOLIC BLOOD PRESSURE: 60 MMHG | RESPIRATION RATE: 18 BRPM | HEIGHT: 59 IN | TEMPERATURE: 98.4 F | OXYGEN SATURATION: 90 %

## 2022-04-25 DIAGNOSIS — I34.0 NONRHEUMATIC MITRAL (VALVE) INSUFFICIENCY: ICD-10-CM

## 2022-04-25 PROCEDURE — 76377 3D RENDER W/INTRP POSTPROCES: CPT | Mod: 26

## 2022-04-25 PROCEDURE — 93312 ECHO TRANSESOPHAGEAL: CPT

## 2022-04-25 PROCEDURE — 93312 ECHO TRANSESOPHAGEAL: CPT | Mod: 26

## 2022-04-25 PROCEDURE — 99213 OFFICE O/P EST LOW 20 MIN: CPT

## 2022-04-27 NOTE — END OF VISIT
[FreeTextEntry3] : \par I, IMELDA SANTAMARIA , am scribing for and in the presence of MARIE ESPINOZA the following sections: History of present illness, past Medical/family/surgical/family/social history, review of systems, vital signs, physical exam and disposition.\par \par \par

## 2022-04-27 NOTE — REVIEW OF SYSTEMS
[Negative] : Psychiatric [Feeling Poorly] : feeling poorly [Feeling Tired] : feeling tired [Lower Ext Edema] : lower extremity edema [SOB on Exertion] : shortness of breath during exertion [Fever] : no fever [Chills] : no chills [Heart Rate Is Slow] : the heart rate was not slow [Heart Rate Is Fast] : the heart rate was not fast [Chest Pain] : no chest pain [Palpitations] : no palpitations [Leg Claudication] : no intermittent leg claudication [Shortness Of Breath] : no shortness of breath [Wheezing] : no wheezing [Cough] : no cough [Orthopnea] : no orthopnea [PND] : no PND

## 2022-04-27 NOTE — DATA REVIEWED
[FreeTextEntry1] : YONATAN 4/25/22 \par \par 1. No LA/RA/RANDALL/RAA thrombus seen.\par  2. Color flow Doppler reveals evidence of an interatrial shunt ( \par bidirectional).\par  3. Severe mitral regurgitation.\par  4. Moderate-to-severe tricuspid regurgitation.\par  5. Mild pulmonic regurgitation.\par  6. Dilated right ventricular size.\par  7. Reduced right ventricular systolic function.\par  8. Left ventricular ejection fraction is 55-60%.\par  9. Trivial pericardial effusion.\par 10. Results discussed with Dr. Sweet.\par

## 2022-04-27 NOTE — HISTORY OF PRESENT ILLNESS
[FreeTextEntry1] : 81 year old female with a history of HTN, gout and chronic diastolic heart failure with severe mitral regurgitation s/p MitraClip on 9/24/20 who presents for follow up after testing for evaluation of worsening mitral regurgitation and failed mitraclip. NYHA Class III. \par \par TTE 8/25/21: EF 65%. Mild AI. Moderate to severe MR with mean gradient 7 mmHg. Moderate TR. \par \par YONATAN 4/25/22: severe eccentric mitral regurgitation. two regurgitant jets that arise from A1-P1 and A3-P3. moderate to severe TR. EF 55-60%. Trivial pericardial effusion. evidence of an interatrial shunt (bidirectional). \par \par The patient reports worsening fatigue, shortness of breath and LE swelling.

## 2022-04-27 NOTE — ASSESSMENT
[FreeTextEntry1] : 81 year old female with a history of HTN, gout and chronic diastolic heart failure with severe mitral regurgitation s/p MitraClip on 9/24/20 who presents with increased severity of mitral regurgitation and worsening SOB/GOMEZ. NYHA Class III. \par \par Dr. Núñez reviewed the echocardiogram images with the patient and her daughter and discussed the case with Dr. Galarza and Dr. Sweet. Dr. Núñez discussed the risks, benefits and alternatives to surgery and the various surgical approaches, minimally invasive versus sternotomy. Risks include but not limited to death, heart attack, bleeding, stroke, kidney problems and infection.  Dr. Núñez quoted a 8% operative mortality and complication risk. Dr. Núñez feels the patient will benefit from and is a candidate for mitral valve replacement via sternotomy. Dr. Núñez has explained that the patient is not a candidate for a transcatheter intervention. All questions were addressed. \par \par -The patient is a candidate for mitral valve replacement via sternotomy. Patient would like some time to think over prior to finalizing her decision.\par -Continue current medication regimen. \par -f/u with Dr. Messi Martinez. \par

## 2022-04-27 NOTE — PHYSICAL EXAM
[Neck Appearance] : the appearance of the neck was normal [Sclera] : the sclera and conjunctiva were normal [Respiration, Rhythm And Depth] : normal respiratory rhythm and effort [Normal Rate] : normal [Rhythm Regular] : regular [Normal S1] : normal S1 [Normal S2] : normal S2 [II] : a grade 2 [Examination Of The Chest] : the chest was normal in appearance [No Abnormalities] : the abdominal aorta was not enlarged and no bruit was heard [Bowel Sounds] : normal bowel sounds [No CVA Tenderness] : no ~M costovertebral angle tenderness [Abnormal Walk] : normal gait [] : no rash [No Focal Deficits] : no focal deficits [Oriented To Time, Place, And Person] : oriented to person, place, and time [FreeTextEntry1] : deferred

## 2022-06-20 ENCOUNTER — TRANSCRIPTION ENCOUNTER (OUTPATIENT)
Age: 82
End: 2022-06-20

## 2022-06-23 ENCOUNTER — APPOINTMENT (OUTPATIENT)
Dept: CARE COORDINATION | Facility: HOME HEALTH | Age: 82
End: 2022-06-23
Payer: MEDICARE

## 2022-06-23 VITALS
HEART RATE: 90 BPM | RESPIRATION RATE: 18 BRPM | DIASTOLIC BLOOD PRESSURE: 60 MMHG | SYSTOLIC BLOOD PRESSURE: 92 MMHG | OXYGEN SATURATION: 95 %

## 2022-06-23 DIAGNOSIS — Z78.9 OTHER SPECIFIED HEALTH STATUS: ICD-10-CM

## 2022-06-23 DIAGNOSIS — Z82.49 FAMILY HISTORY OF ISCHEMIC HEART DISEASE AND OTHER DISEASES OF THE CIRCULATORY SYSTEM: ICD-10-CM

## 2022-06-23 PROCEDURE — 99496 TRANSJ CARE MGMT HIGH F2F 7D: CPT

## 2022-06-27 ENCOUNTER — APPOINTMENT (OUTPATIENT)
Dept: HOME HEALTH SERVICES | Facility: HOME HEALTH | Age: 82
End: 2022-06-27

## 2022-06-27 VITALS
DIASTOLIC BLOOD PRESSURE: 70 MMHG | RESPIRATION RATE: 18 BRPM | WEIGHT: 138.8 LBS | HEART RATE: 95 BPM | BODY MASS INDEX: 28.03 KG/M2 | SYSTOLIC BLOOD PRESSURE: 100 MMHG | OXYGEN SATURATION: 95 %

## 2022-06-27 DIAGNOSIS — I34.0 NONRHEUMATIC MITRAL (VALVE) INSUFFICIENCY: ICD-10-CM

## 2022-06-27 DIAGNOSIS — M10.9 GOUT, UNSPECIFIED: ICD-10-CM

## 2022-06-27 PROBLEM — Z82.49 FAMILY HISTORY OF CORONARY ARTERY DISEASE: Status: ACTIVE | Noted: 2022-03-15

## 2022-06-27 PROBLEM — Z78.9 DOES NOT USE ILLICIT DRUGS: Status: ACTIVE | Noted: 2022-03-15

## 2022-06-27 PROBLEM — Z78.9 NON-SMOKER: Status: ACTIVE | Noted: 2022-03-15

## 2022-06-27 PROCEDURE — 99344 HOME/RES VST NEW MOD MDM 60: CPT

## 2022-06-27 RX ORDER — TORSEMIDE 20 MG/1
20 TABLET ORAL DAILY
Qty: 90 | Refills: 3 | Status: DISCONTINUED | COMMUNITY
Start: 2022-06-27 | End: 2022-06-27

## 2022-06-27 RX ORDER — AMLODIPINE BESYLATE 5 MG/1
5 TABLET ORAL
Qty: 30 | Refills: 0 | Status: COMPLETED | COMMUNITY
Start: 2021-11-19 | End: 2022-06-27

## 2022-06-27 RX ORDER — ATENOLOL 25 MG/1
25 TABLET ORAL
Refills: 0 | Status: ACTIVE | COMMUNITY
Start: 2021-07-02

## 2022-06-27 RX ORDER — APIXABAN 5 MG/1
5 TABLET, FILM COATED ORAL
Refills: 0 | Status: DISCONTINUED | COMMUNITY
End: 2022-06-27

## 2022-06-27 RX ORDER — LEVOTHYROXINE SODIUM 0.05 MG/1
50 TABLET ORAL DAILY
Refills: 0 | Status: DISCONTINUED | COMMUNITY
End: 2022-06-27

## 2022-06-27 RX ORDER — SPIRONOLACTONE 25 MG/1
25 TABLET ORAL DAILY
Refills: 0 | Status: DISCONTINUED | COMMUNITY
End: 2022-06-27

## 2022-06-27 NOTE — PHYSICAL EXAM
[No Acute Distress] : no acute distress [Well Nourished] : well nourished [Normal Sclera/Conjunctiva] : normal sclera/conjunctiva [PERRL] : pupils equal, round and reactive to light [Normal Outer Ear/Nose] : the ears and nose were normal in appearance [No JVD] : no jugular venous distention [Supple] : the neck was supple [No LAD] : no lymphadenopathy [No Respiratory Distress] : no respiratory distress [Clear to Auscultation] : lungs were clear to auscultation bilaterally [Normal Rate] : heart rate was normal  [Regular Rhythm] : with a regular rhythm [Normal Bowel Sounds] : normal bowel sounds [Non Tender] : non-tender [Soft] : abdomen soft [Normal Gait] : normal gait [No Joint Swelling] : no joint swelling seen [No Rash] : no rash [No Skin Lesions] : no skin lesions [Oriented x3] : oriented to person, place, and time [Normal Affect] : the affect was normal [de-identified] : slightly diminished at bases [de-identified] : 3+ pitting edema to b/l lower extremities

## 2022-06-27 NOTE — PHYSICAL EXAM
[No Acute Distress] : no acute distress [Well Nourished] : well nourished [Well Developed] : well developed [Normal Sclera/Conjunctiva] : normal sclera/conjunctiva [Normal Outer Ear/Nose] : the outer ears and nose were normal in appearance [Supple] : supple [No Respiratory Distress] : no respiratory distress  [No Accessory Muscle Use] : no accessory muscle use [Clear to Auscultation] : lungs were clear to auscultation bilaterally [Normal Rate] : normal rate  [Irregularly Irregular] : irregularly irregular [Soft] : abdomen soft [Non Tender] : non-tender [Non-distended] : non-distended [Normal Bowel Sounds] : normal bowel sounds [No CVA Tenderness] : no CVA  tenderness [No Rash] : no rash [No Focal Deficits] : no focal deficits [Memory Grossly Normal] : memory grossly normal [Normal Affect] : the affect was normal [Alert and Oriented x3] : oriented to person, place, and time [Normal Mood] : the mood was normal [Normal Insight/Judgement] : insight and judgment were intact [de-identified] : Bilateral lower extremity edema

## 2022-06-27 NOTE — HISTORY OF PRESENT ILLNESS
[Patient] : patient [Family Member] : family member [FreeTextEntry1] : Gait instability [FreeTextEntry2] : Patient being seen for initial House Calls visit, recently in the hospital for CHF exacerbation.  Patient lives with DTR and her family.  Patient has been followed by TCM team who has been adjusting diuretics based on weight and BP. BP has been low over the past few days.  Weights have been stable. \par With low BP patient denies any lightheaded/dizziness.

## 2022-06-27 NOTE — REVIEW OF SYSTEMS
[Fever] : no fever [Chills] : no chills [Fatigue] : fatigue [Chest Pain] : no chest pain [Lower Ext Edema] : lower extremity edema [Dyspnea on Exertion] : dyspnea on exertion [Abdominal Pain] : no abdominal pain [Nausea] : no nausea [Constipation] : no constipation [Diarrhea] : diarrhea [Vomiting] : no vomiting [Negative] : Psychiatric

## 2022-06-27 NOTE — REVIEW OF SYSTEMS
[Lower Ext Edema] : lower extremity edema [Dyspnea on Exertion] : dyspnea on exertion [Constipation] : constipation [Negative] : Neurological

## 2022-06-27 NOTE — CURRENT MEDS
[Takes medication as prescribed] : takes [None] : Patient does not have any barriers to medication adherence [Yes] : Reviewed medication list for presence of high-risk medications. [Blood Thinners] : blood thinners 17

## 2022-06-27 NOTE — ASSESSMENT
[FreeTextEntry1] : Patient is a 82 year y/o female enrolled in the [facility] program with a history of A. fib on Eliquis diagnosed postop after third mitral clip, gout, CKD, CHF, mitral regurgitation status post mitral clips x3, and hypertension recently admitted from 6/14/22-6/20/22 to [facility] for CHF and pleural effusion.  Patient presented to the ER with fatigue, shortness of breath, and increased lower extremity swelling.  Patient was admitted and treated for acute diastolic heart failure with IV Lasix.  Patient was noted to have left more than right sided effusion and underwent thoracentesis with significant fluid removal.  Patient on admission was in rapid A. fib and heart rate now better controlled with adjustment of atenolol.  Patient had acute kidney injury on admission which resolved.  Patient's bilirubin was elevated likely secondary to cardiorenal syndrome and has been downtrending.  Patient was seen and comanagement with cardiology who adjusted oral torsemide dose on discharge.  Patient was set up for home care and possible housecalls program.  Patient was seen by palliative care as well.  Patient medically optimized and stable for discharge home.\par \par Patient contact within 48 hours of discharge and d/c instructions reviewed.  Discharge medications were reviewed and reconciled with the current medication list and medications in home.\par \par Patient evaluated in home and on arrival patient alert and oriented x3, and in no acute distress.  Patient sitting on couch and breathing comfortably.  Patient states she feels tired and still has some shortness of breath with exertion.  Patient states she had the same shortness of breath when she left the hospital and it has not gotten worse.  CHF education given including low sodium diet, daily weights, and 1.5 liter fluid restriction.  Patient has scale in home.  Patient states her weight today was 139.4lbs.  As per granddaughter who was present for the exam states that the patients b/p with in the 80 systolic this morning.  B/P remains on the low side.  Advised to hold the evening dose of torsemide, and to call me tomorrow prior to taking the diuretics and will advise what to do.  Patient and granddaughter verbalized understanding.  Patient still with lower extremity swelling, advised to keep legs elevate.  Patient denies chest pain, palpitations, shortness of breath, abdominal pain, nausea, vomiting, diarrhea, lightheaded or dizziness.\par \par Patient with no other questions or concerns at this time.  Patient given yellow card with contact information on it and advised to call with any questions or concerns.\par

## 2022-06-27 NOTE — PLAN
[FreeTextEntry1] : 1) CHF\par - Hold evening dose of torsemide due to low b/p\par - Continue to monitor weight and b/p\par - Will keep close contact with daughter to adjust torsemide and spironolactone as needed\par - Educated on the need for daily weights.\par - Advised to call for an increase of greater than 2 lbs in a day or 5 lbs in a week.\par - Adhere to a low salt diet: educated on foods that should be avoided such as processed or fast foods.\par - Limit fluids to 1.5 liters per day.\par - Follow-up with cardiologist\par - Contact information given and patient advised to call with any questions or concerns.\par \par 2) A.fib\par - Controlled\par - Continue on atenolol and eliquis\par \par 3) Hypothyroid\par - Continue on levothyroxine\par \par 4) Gout\par - Continue on allopurinol\par \par 5) CKD\par - Monitor labs as outpatient\par \par Patient referred to House Calls, and has appointment with Cardiology scheduled.

## 2022-06-27 NOTE — HISTORY OF PRESENT ILLNESS
[Family Member] : family member [FreeTextEntry1] : Follow-up for discharge from [facility] for CHF, Plueral effusion\par  [de-identified] : Patient is a 82 year y/o female enrolled in the [facility] program with a history of A. fib on Eliquis diagnosed postop after third mitral clip, gout, CKD, CHF, mitral regurgitation status post mitral clips x3, and hypertension recently admitted from 6/14/22-6/20/22 to [facility] for CHF and pleural effusion.  Patient presented to the ER with fatigue, shortness of breath, and increased lower extremity swelling.  Patient was admitted and treated for acute diastolic heart failure with IV Lasix.  Patient was noted to have left more than right sided effusion and underwent thoracentesis with significant fluid removal.  Patient on admission was in rapid A. fib and heart rate now better controlled with adjustment of atenolol.  Patient had acute kidney injury on admission which resolved.  Patient's bilirubin was elevated likely secondary to cardiorenal syndrome and has been downtrending.  Patient was seen and comanagement with cardiology who adjusted oral torsemide dose on discharge.  Patient was set up for home care and possible housecalls program.  Patient was seen by palliative care as well.  Patient medically optimized and stable for discharge home.\par \par Patient contact within 48 hours of discharge and d/c instructions reviewed.  Discharge medications were reviewed and reconciled with the current medication list and medications in home.\par \par Patient evaluated in home and on arrival patient alert and oriented x3, and in no acute distress.  Patient sitting on couch and breathing comfortably.  Patient states she feels tired and still has some shortness of breath with exertion.  Patient states she had the same shortness of breath when she left the hospital and it has not gotten worse.  CHF education given including low sodium diet, daily weights, and 1.5 liter fluid restriction.  Patient has scale in home.  Patient states her weight today was 139.4lbs.  As per granddaughter who was present for the exam states that the patients b/p with in the 80 systolic this morning.  B/P remains on the low side.  Advised to hold the evening dose of torsemide, and to call me tomorrow prior to taking the diuretics and will advise what to do.  Patient and granddaughter verbalized understanding.  Patient still with lower extremity swelling, advised to keep legs elevate.  Patient denies chest pain, palpitations, shortness of breath, abdominal pain, nausea, vomiting, diarrhea, lightheaded or dizziness.\par \par Patient with no other questions or concerns at this time.  Patient given yellow card with contact information on it and advised to call with any questions or concerns.\par \par \par

## 2022-07-10 ENCOUNTER — TRANSCRIPTION ENCOUNTER (OUTPATIENT)
Age: 82
End: 2022-07-10

## 2022-07-14 ENCOUNTER — APPOINTMENT (OUTPATIENT)
Dept: HOME HEALTH SERVICES | Facility: HOME HEALTH | Age: 82
End: 2022-07-14

## 2022-07-14 VITALS
TEMPERATURE: 97.5 F | BODY MASS INDEX: 28.84 KG/M2 | OXYGEN SATURATION: 96 % | DIASTOLIC BLOOD PRESSURE: 82 MMHG | WEIGHT: 142.8 LBS | HEART RATE: 97 BPM | SYSTOLIC BLOOD PRESSURE: 116 MMHG | RESPIRATION RATE: 18 BRPM

## 2022-07-14 RX ORDER — TORSEMIDE 20 MG/1
20 TABLET ORAL
Qty: 180 | Refills: 0 | Status: DISCONTINUED | COMMUNITY
End: 2022-07-14

## 2022-07-22 ENCOUNTER — NON-APPOINTMENT (OUTPATIENT)
Age: 82
End: 2022-07-22

## 2022-07-30 ENCOUNTER — TRANSCRIPTION ENCOUNTER (OUTPATIENT)
Age: 82
End: 2022-07-30

## 2022-08-01 ENCOUNTER — NON-APPOINTMENT (OUTPATIENT)
Age: 82
End: 2022-08-01

## 2022-08-01 ENCOUNTER — APPOINTMENT (OUTPATIENT)
Dept: HOME HEALTH SERVICES | Facility: HOME HEALTH | Age: 82
End: 2022-08-01

## 2022-08-01 RX ORDER — TORSEMIDE 20 MG/1
20 TABLET ORAL
Refills: 0 | Status: DISCONTINUED | COMMUNITY
Start: 2022-07-14 | End: 2022-08-01

## 2022-08-01 RX ORDER — SPIRONOLACTONE 25 MG/1
25 TABLET ORAL TWICE DAILY
Refills: 0 | Status: DISCONTINUED | COMMUNITY
Start: 2022-07-14 | End: 2022-08-01

## 2022-08-01 RX ORDER — METOLAZONE 2.5 MG/1
2.5 TABLET ORAL
Refills: 0 | Status: DISCONTINUED | COMMUNITY
Start: 2022-07-14 | End: 2022-08-01

## 2022-08-01 RX ORDER — SPIRONOLACTONE 25 MG/1
25 TABLET ORAL DAILY
Qty: 90 | Refills: 2 | Status: DISCONTINUED | COMMUNITY
Start: 2022-06-27 | End: 2022-08-01

## 2022-08-01 RX ORDER — ALLOPURINOL 100 MG/1
100 TABLET ORAL TWICE DAILY
Refills: 0 | Status: DISCONTINUED | COMMUNITY
Start: 2022-07-14 | End: 2022-08-01

## 2022-08-03 ENCOUNTER — APPOINTMENT (OUTPATIENT)
Dept: HOME HEALTH SERVICES | Facility: HOME HEALTH | Age: 82
End: 2022-08-03

## 2022-08-03 VITALS — DIASTOLIC BLOOD PRESSURE: 70 MMHG | OXYGEN SATURATION: 98 % | SYSTOLIC BLOOD PRESSURE: 100 MMHG | HEART RATE: 88 BPM

## 2022-08-03 PROCEDURE — 99496 TRANSJ CARE MGMT HIGH F2F 7D: CPT

## 2022-08-03 RX ORDER — SPIRONOLACTONE 25 MG/1
25 TABLET ORAL DAILY
Qty: 90 | Refills: 2 | Status: ACTIVE | COMMUNITY
Start: 2022-08-03

## 2022-08-03 NOTE — HISTORY OF PRESENT ILLNESS
[Patient] : patient [Family Member] : family member [FreeTextEntry1] : Gait instability [FreeTextEntry2] : Patient being seen for follow up and post discharge visit, patient was discharged from [facility] on 7/29/22 after being treated for CHF and electrolyte imblance.  Patient was told to stop all diuretic till today and then resume Torsimide and spironolactone. \par \par As per GDTR patient weight was 137 then down to 134 and now back up to 137 as of this morning.  DTR spoke with cardiologist Dr. Mendez who told to resume meds.

## 2022-09-16 ENCOUNTER — APPOINTMENT (OUTPATIENT)
Dept: HOME HEALTH SERVICES | Facility: HOME HEALTH | Age: 82
End: 2022-09-16

## 2022-09-16 VITALS
SYSTOLIC BLOOD PRESSURE: 118 MMHG | RESPIRATION RATE: 18 BRPM | DIASTOLIC BLOOD PRESSURE: 64 MMHG | HEART RATE: 83 BPM | WEIGHT: 138.8 LBS | OXYGEN SATURATION: 96 % | BODY MASS INDEX: 28.03 KG/M2 | TEMPERATURE: 98.3 F

## 2022-09-16 RX ORDER — ALLOPURINOL 100 MG/1
100 TABLET ORAL DAILY
Refills: 0 | Status: DISCONTINUED | COMMUNITY
End: 2022-09-16

## 2022-09-16 RX ORDER — ALLOPURINOL 100 MG/1
100 TABLET ORAL
Refills: 0 | Status: ACTIVE | COMMUNITY
Start: 2022-09-16

## 2022-10-06 ENCOUNTER — APPOINTMENT (OUTPATIENT)
Dept: HOME HEALTH SERVICES | Facility: HOME HEALTH | Age: 82
End: 2022-10-06

## 2022-10-06 VITALS
OXYGEN SATURATION: 97 % | BODY MASS INDEX: 27.95 KG/M2 | SYSTOLIC BLOOD PRESSURE: 118 MMHG | DIASTOLIC BLOOD PRESSURE: 82 MMHG | RESPIRATION RATE: 18 BRPM | TEMPERATURE: 98.5 F | HEART RATE: 71 BPM | WEIGHT: 138.4 LBS

## 2022-10-11 ENCOUNTER — NON-APPOINTMENT (OUTPATIENT)
Age: 82
End: 2022-10-11

## 2022-10-31 ENCOUNTER — APPOINTMENT (OUTPATIENT)
Dept: HOME HEALTH SERVICES | Facility: HOME HEALTH | Age: 82
End: 2022-10-31

## 2022-11-11 ENCOUNTER — APPOINTMENT (OUTPATIENT)
Dept: HOME HEALTH SERVICES | Facility: HOME HEALTH | Age: 82
End: 2022-11-11

## 2022-11-11 VITALS
WEIGHT: 142 LBS | SYSTOLIC BLOOD PRESSURE: 112 MMHG | OXYGEN SATURATION: 98 % | BODY MASS INDEX: 28.68 KG/M2 | DIASTOLIC BLOOD PRESSURE: 70 MMHG | HEART RATE: 86 BPM

## 2022-11-11 DIAGNOSIS — Z23 ENCOUNTER FOR IMMUNIZATION: ICD-10-CM

## 2022-11-11 PROCEDURE — 99349 HOME/RES VST EST MOD MDM 40: CPT

## 2022-11-11 NOTE — HISTORY OF PRESENT ILLNESS
[Patient] : patient [Family Member] : family member [FreeTextEntry1] : Gait instability [FreeTextEntry2] : Patient being seen for follow up patient states that her appetite has increased and she has gained some weight stable at 142 for the past few days. \par No further nose bleeds, going to ENT today for follow up. Is back on Eliquis.

## 2022-11-11 NOTE — PHYSICAL EXAM
[No Acute Distress] : no acute distress [Well Nourished] : well nourished [Normal Sclera/Conjunctiva] : normal sclera/conjunctiva [PERRL] : pupils equal, round and reactive to light [Normal Outer Ear/Nose] : the ears and nose were normal in appearance [No JVD] : no jugular venous distention [Supple] : the neck was supple [No LAD] : no lymphadenopathy [No Respiratory Distress] : no respiratory distress [Clear to Auscultation] : lungs were clear to auscultation bilaterally [No Accessory Muscle Use] : no accessory muscle use [Normal Rate] : heart rate was normal  [Normal Bowel Sounds] : normal bowel sounds [Non Tender] : non-tender [Soft] : abdomen soft [Normal Gait] : normal gait [No Joint Swelling] : no joint swelling seen [No Rash] : no rash [No Skin Lesions] : no skin lesions [Oriented x3] : oriented to person, place, and time [Normal Affect] : the affect was normal [de-identified] : Irregular/Irregular no edema to b/l lower extremities

## 2022-11-16 ENCOUNTER — APPOINTMENT (OUTPATIENT)
Dept: HOME HEALTH SERVICES | Facility: HOME HEALTH | Age: 82
End: 2022-11-16

## 2022-12-08 ENCOUNTER — APPOINTMENT (OUTPATIENT)
Dept: HOME HEALTH SERVICES | Facility: HOME HEALTH | Age: 82
End: 2022-12-08

## 2022-12-08 VITALS
WEIGHT: 144 LBS | SYSTOLIC BLOOD PRESSURE: 118 MMHG | OXYGEN SATURATION: 97 % | HEART RATE: 74 BPM | DIASTOLIC BLOOD PRESSURE: 64 MMHG | TEMPERATURE: 98.8 F | RESPIRATION RATE: 18 BRPM | BODY MASS INDEX: 29.08 KG/M2

## 2022-12-09 ENCOUNTER — LABORATORY RESULT (OUTPATIENT)
Age: 82
End: 2022-12-09

## 2022-12-12 ENCOUNTER — APPOINTMENT (OUTPATIENT)
Dept: NEPHROLOGY | Facility: CLINIC | Age: 82
End: 2022-12-12

## 2022-12-12 VITALS
TEMPERATURE: 97.8 F | BODY MASS INDEX: 28.63 KG/M2 | SYSTOLIC BLOOD PRESSURE: 114 MMHG | DIASTOLIC BLOOD PRESSURE: 74 MMHG | HEIGHT: 59 IN | OXYGEN SATURATION: 99 % | RESPIRATION RATE: 18 BRPM | WEIGHT: 142 LBS | HEART RATE: 88 BPM

## 2022-12-12 DIAGNOSIS — Z00.00 ENCOUNTER FOR GENERAL ADULT MEDICAL EXAMINATION W/OUT ABNORMAL FINDINGS: ICD-10-CM

## 2022-12-12 PROCEDURE — 99214 OFFICE O/P EST MOD 30 MIN: CPT

## 2022-12-12 NOTE — ASSESSMENT
[FreeTextEntry1] : Omayra Summers is an 82-year female originally from [facility] who has a past medical history significant for atrial fibrillation on Eliquis, chronic diastolic heart failure, severe  mitral regurgitation s/p valve clip on 3 occasions,  hypothyroidism, gout, and stage III CKD who I saw during an admission to [facility] in July 2022 for acute on chronic kidney disease.  Ms. Summers was taking both metolazone and BID torsemide prior to admission.  They were held and the renal function improved. \par \par Hospital workup:\par \par Aldosterone (serum)                 52.8 ng/dL   (< 23.2 normal)\par Urine protein/creatinine ratio     378 mg per gram\par \par IMPRESSION:\par \par (1) Chronic kidney disease.  Renal U/S from the summer of 2022 demonstrated a R. kidney measuring 8.2 cm, and left kidney measuring 7.8 cm with no hydronephrosis.  The BUN/creatinine levels have trended as follows: 32/1.1 (09/01/2020) --> 37/1.23 (08/04/2022), 53/1.53 (12/09/2022).  The eGFR is 54 mL per minute.  I suspect the increase in the serum creatinine (and BUN) are related to diuretic use.\par \par (2) Hyponatremia.  During the hospital admission I thought this was due to oral fluids in the presence of diuretics. She maintains an oral fluid restriction of 1500 mL per day.  The recent serum sodium was 136 meq per liter. \par \par (3) Congestive heart failure.  No evidence of CHF on physical exam. \par \par (4) Elevated alkaline phosphatase. This can be seen in hyperparathyroidism. \par \par RECOMMENDATIONS:\par \par (1) Continue current diuretic regimen\par (2) Continue oral fluid restriction to 1500 mL\par (3) Continue to keep salt OUT of the diet.\par (4) I made no changes to the medication regimen.\par (5)  Avoid the use of NSAIDS (ibuprofen, Motrin, Aleve,Celebrex, etc.).  Okay to use Tylenol.\par (6) Suggest checking an iPTH and 25 hydroxy vitamin D levels.\par \par If all is stable, I will see Ms. Summers back in 6 months. \par \par \par

## 2022-12-12 NOTE — PHYSICAL EXAM
[General Appearance - Alert] : alert [General Appearance - In No Acute Distress] : in no acute distress [Sclera] : the sclera and conjunctiva were normal [Extraocular Movements] : extraocular movements were intact [Neck Appearance] : the appearance of the neck was normal [] : no respiratory distress [Respiration, Rhythm And Depth] : normal respiratory rhythm and effort [Exaggerated Use Of Accessory Muscles For Inspiration] : no accessory muscle use [Auscultation Breath Sounds / Voice Sounds] : lungs were clear to auscultation bilaterally [Heart Rate And Rhythm] : heart rate was normal and rhythm regular [Heart Sounds] : normal S1 and S2 [Heart Sounds Gallop] : no gallops [Heart Sounds Pericardial Friction Rub] : no pericardial rub [FreeTextEntry1] : small bilateral distal LE edema [Abnormal Walk] : normal gait [Involuntary Movements] : no involuntary movements were seen [Skin Color & Pigmentation] : normal skin color and pigmentation [Skin Turgor] : normal skin turgor [Oriented To Time, Place, And Person] : oriented to person, place, and time [Impaired Insight] : insight and judgment were intact [Affect] : the affect was normal [Mood] : the mood was normal

## 2022-12-12 NOTE — HISTORY OF PRESENT ILLNESS
[FreeTextEntry1] : Omayra Summers is an 82-year female originally from [facility] who has a past medical history significant for atrial fibrillation on Eliquis, chronic diastolic heart failure, severe  mitral regurgitation s/p valve clip on 3 occasions,  hypothyroidism, gout, and stage III CKD who I saw during an admission to [facility] in July 2022 for acute on chronic kidney disease.  Ms. Summers was taking both metolazone and BID torsemide prior to admission.  They were held and the renal function improved.   She exercises daily. Ms. Summers sees Dr. Eduard Burnham every 2 months.

## 2022-12-12 NOTE — CONSULT LETTER
[Dear  ___] : Dear  [unfilled], [Consult Letter:] : I had the pleasure of evaluating your patient, [unfilled]. [Please see my note below.] : Please see my note below. [Consult Closing:] : Thank you very much for allowing me to participate in the care of this patient.  If you have any questions, please do not hesitate to contact me. [Sincerely,] : Sincerely, [___] : [unfilled]

## 2023-01-04 ENCOUNTER — APPOINTMENT (OUTPATIENT)
Dept: HOME HEALTH SERVICES | Facility: HOME HEALTH | Age: 83
End: 2023-01-04
Payer: MEDICARE

## 2023-01-04 VITALS
OXYGEN SATURATION: 94 % | DIASTOLIC BLOOD PRESSURE: 80 MMHG | SYSTOLIC BLOOD PRESSURE: 104 MMHG | HEART RATE: 84 BPM | BODY MASS INDEX: 29.29 KG/M2 | WEIGHT: 145 LBS

## 2023-01-04 PROCEDURE — 99349 HOME/RES VST EST MOD MDM 40: CPT

## 2023-01-04 PROCEDURE — 99497 ADVNCD CARE PLAN 30 MIN: CPT

## 2023-01-04 NOTE — ASSESSMENT
[FreeTextEntry1] : Discussed with patient and GDTR about MOLSt and advanced care planning.  Patient unsure of her wishes at this time.  Her DTR is HCP and would like to discuss with her.  20 min

## 2023-01-04 NOTE — PHYSICAL EXAM
[No Acute Distress] : no acute distress [Well Nourished] : well nourished [Normal Sclera/Conjunctiva] : normal sclera/conjunctiva [PERRL] : pupils equal, round and reactive to light [Normal Outer Ear/Nose] : the ears and nose were normal in appearance [No JVD] : no jugular venous distention [Supple] : the neck was supple [No LAD] : no lymphadenopathy [No Respiratory Distress] : no respiratory distress [Clear to Auscultation] : lungs were clear to auscultation bilaterally [No Accessory Muscle Use] : no accessory muscle use [Normal Rate] : heart rate was normal  [Normal Bowel Sounds] : normal bowel sounds [Non Tender] : non-tender [Soft] : abdomen soft [Normal Gait] : normal gait [No Joint Swelling] : no joint swelling seen [No Rash] : no rash [No Skin Lesions] : no skin lesions [Oriented x3] : oriented to person, place, and time [Normal Affect] : the affect was normal [de-identified] : Irregular/Irregular no edema to b/l lower extremities

## 2023-01-04 NOTE — HISTORY OF PRESENT ILLNESS
[Patient] : patient [Family Member] : family member [FreeTextEntry1] : Gait instability [FreeTextEntry2] : Patient being seen for follow up patient states that her appetite has increased and she has gained some weight stable at 145, which is down from 149 after the holidays. \par Pt c/o redness to low Rt eye which started yesterday.  \par Moving bowels well.

## 2023-02-09 ENCOUNTER — APPOINTMENT (OUTPATIENT)
Dept: HOME HEALTH SERVICES | Facility: HOME HEALTH | Age: 83
End: 2023-02-09

## 2023-02-09 VITALS
HEART RATE: 74 BPM | TEMPERATURE: 98.2 F | OXYGEN SATURATION: 95 % | DIASTOLIC BLOOD PRESSURE: 64 MMHG | RESPIRATION RATE: 18 BRPM | SYSTOLIC BLOOD PRESSURE: 110 MMHG

## 2023-02-21 ENCOUNTER — NON-APPOINTMENT (OUTPATIENT)
Age: 83
End: 2023-02-21

## 2023-03-09 ENCOUNTER — APPOINTMENT (OUTPATIENT)
Dept: HOME HEALTH SERVICES | Facility: HOME HEALTH | Age: 83
End: 2023-03-09
Payer: MEDICARE

## 2023-03-09 VITALS
BODY MASS INDEX: 29.89 KG/M2 | HEART RATE: 76 BPM | SYSTOLIC BLOOD PRESSURE: 110 MMHG | WEIGHT: 148 LBS | DIASTOLIC BLOOD PRESSURE: 80 MMHG | RESPIRATION RATE: 18 BRPM

## 2023-03-09 DIAGNOSIS — B30.9 VIRAL CONJUNCTIVITIS, UNSPECIFIED: ICD-10-CM

## 2023-03-09 PROCEDURE — 99349 HOME/RES VST EST MOD MDM 40: CPT

## 2023-03-09 NOTE — HISTORY OF PRESENT ILLNESS
[Patient] : patient [Family Member] : family member [FreeTextEntry1] : Gait instability [FreeTextEntry2] : Patient being seen for follow up patient states that her appetite has increased and she has gained some weight stable at 148. \par \par Patient states sleeping well, eating well, moving bowels regularly.  \par

## 2023-03-09 NOTE — PHYSICAL EXAM
[No Acute Distress] : no acute distress [Well Nourished] : well nourished [Normal Sclera/Conjunctiva] : normal sclera/conjunctiva [PERRL] : pupils equal, round and reactive to light [Normal Outer Ear/Nose] : the ears and nose were normal in appearance [No JVD] : no jugular venous distention [Supple] : the neck was supple [No LAD] : no lymphadenopathy [No Respiratory Distress] : no respiratory distress [Clear to Auscultation] : lungs were clear to auscultation bilaterally [No Accessory Muscle Use] : no accessory muscle use [Normal Rate] : heart rate was normal  [Normal Bowel Sounds] : normal bowel sounds [Non Tender] : non-tender [Soft] : abdomen soft [Normal Gait] : normal gait [No Joint Swelling] : no joint swelling seen [No Rash] : no rash [No Skin Lesions] : no skin lesions [Oriented x3] : oriented to person, place, and time [Normal Affect] : the affect was normal [de-identified] : Irregular/Irregular no edema to b/l lower extremities

## 2023-04-24 NOTE — DISCHARGE NOTE PROVIDER - NSDCQMERRANDS_GEN_ALL_CORE
[FreeTextEntry1] : No clear findings on the RT.\par \par Recommend CT LT ankle to evaluate for tarsal coalition. \par \par WBAT in supportive footwear.\par Ice to affected area, NSAIDS prn.\par \par Further treatment pending CT results. \par \par 
No

## 2023-04-27 NOTE — HISTORY OF PRESENT ILLNESS
[facility]  coding opportunities       Chart reviewed, no opportunity found: CHART REVIEWED, NO OPPORTUNITY FOUND        Patients Insurance        Commercial Insurance: [employer] [FreeTextEntry1] : 80 year old female with a history of HTN, gout and chronic diastolic heart failure with severe mitral regurgitation who present for follow up after testing.\par \par The patient continues to complaint of a persistent cough and intermittent LE edema. The patient has SOB when walking up the stairs or doing laundry; she denies SOB at rest. She also denies chest pain, orthopnea, PND, dizziness, syncope and palpitations.\par \par The patient lives at home by herself. She remains independent in her ADLs. Her daughter lives close by.

## 2023-05-01 ENCOUNTER — APPOINTMENT (OUTPATIENT)
Dept: HOME HEALTH SERVICES | Facility: HOME HEALTH | Age: 83
End: 2023-05-01

## 2023-06-01 ENCOUNTER — APPOINTMENT (OUTPATIENT)
Dept: HOME HEALTH SERVICES | Facility: HOME HEALTH | Age: 83
End: 2023-06-01
Payer: MEDICARE

## 2023-06-01 VITALS
HEART RATE: 86 BPM | OXYGEN SATURATION: 96 % | WEIGHT: 148.8 LBS | SYSTOLIC BLOOD PRESSURE: 100 MMHG | DIASTOLIC BLOOD PRESSURE: 70 MMHG | BODY MASS INDEX: 30.05 KG/M2

## 2023-06-01 PROCEDURE — 99349 HOME/RES VST EST MOD MDM 40: CPT

## 2023-06-01 RX ORDER — APIXABAN 2.5 MG/1
2.5 TABLET, FILM COATED ORAL
Qty: 180 | Refills: 3 | Status: ACTIVE | COMMUNITY
Start: 2022-06-27

## 2023-06-01 NOTE — HEALTH RISK ASSESSMENT
[Independent] : feeding [Some assistance needed] : walking [No falls in past year] : Patient reported no falls in the past year [No] : The patient does not have visual impairment [TimeGetUpGo] : 8

## 2023-06-01 NOTE — HISTORY OF PRESENT ILLNESS
[Patient] : patient [Family Member] : family member [FreeTextEntry1] : Gait instability [FreeTextEntry2] : Patient being seen for follow up for CHF, states was seen by cardiologist who decreased eliquis due to kidney function.  Has a follow up with nephrologist next week. \par Weight has been stable.\par Appetite is good\par Moving bowels well.

## 2023-06-01 NOTE — PHYSICAL EXAM
[No Acute Distress] : no acute distress [Well Nourished] : well nourished [Normal Sclera/Conjunctiva] : normal sclera/conjunctiva [PERRL] : pupils equal, round and reactive to light [Normal Outer Ear/Nose] : the ears and nose were normal in appearance [No JVD] : no jugular venous distention [Supple] : the neck was supple [No LAD] : no lymphadenopathy [No Respiratory Distress] : no respiratory distress [Clear to Auscultation] : lungs were clear to auscultation bilaterally [No Accessory Muscle Use] : no accessory muscle use [Normal Rate] : heart rate was normal  [Normal Bowel Sounds] : normal bowel sounds [Non Tender] : non-tender [Soft] : abdomen soft [Normal Gait] : normal gait [No Joint Swelling] : no joint swelling seen [No Rash] : no rash [No Skin Lesions] : no skin lesions [Oriented x3] : oriented to person, place, and time [Normal Affect] : the affect was normal [de-identified] : Irregular/Irregular no edema to b/l lower extremities

## 2023-06-06 ENCOUNTER — LABORATORY RESULT (OUTPATIENT)
Age: 83
End: 2023-06-06

## 2023-06-13 ENCOUNTER — APPOINTMENT (OUTPATIENT)
Dept: NEPHROLOGY | Facility: CLINIC | Age: 83
End: 2023-06-13
Payer: MEDICARE

## 2023-06-13 VITALS
SYSTOLIC BLOOD PRESSURE: 128 MMHG | OXYGEN SATURATION: 98 % | HEIGHT: 59 IN | TEMPERATURE: 97.8 F | HEART RATE: 84 BPM | WEIGHT: 151 LBS | RESPIRATION RATE: 16 BRPM | DIASTOLIC BLOOD PRESSURE: 72 MMHG | BODY MASS INDEX: 30.44 KG/M2

## 2023-06-13 PROCEDURE — 99214 OFFICE O/P EST MOD 30 MIN: CPT

## 2023-06-13 RX ORDER — CIPROFLOXACIN 3 MG/ML
0.3 SOLUTION OPHTHALMIC EVERY 4 HOURS
Qty: 1 | Refills: 0 | Status: DISCONTINUED | COMMUNITY
Start: 2023-01-05 | End: 2023-06-13

## 2023-06-13 NOTE — HISTORY OF PRESENT ILLNESS
[FreeTextEntry1] : Omayra Summers is an 83-year female originally from [facility] who has a past medical history significant for atrial fibrillation on Eliquis, chronic diastolic heart failure, severe  mitral regurgitation s/p valve clip on 3 occasions,  hypothyroidism, gout, and stage III CKD who I saw during an admission to [facility] in July 2022 for acute on chronic kidney disease.  Ms. Summers was taking both metolazone and BID torsemide prior to admission.  They were held and the renal function improved.   \par \par Ms. Summers is here for follow up.  She has been doing well.  There have no episodes of decompensation of her heart failure.  She last saw Dr. Eduard Burnham around 03/31/2023.  At that time the Eliquis dose was decreased to 2.5 mg BID.

## 2023-06-13 NOTE — PHYSICAL EXAM
[General Appearance - Alert] : alert [General Appearance - In No Acute Distress] : in no acute distress [Sclera] : the sclera and conjunctiva were normal [Extraocular Movements] : extraocular movements were intact [Neck Appearance] : the appearance of the neck was normal [] : no respiratory distress [Respiration, Rhythm And Depth] : normal respiratory rhythm and effort [Exaggerated Use Of Accessory Muscles For Inspiration] : no accessory muscle use [Auscultation Breath Sounds / Voice Sounds] : lungs were clear to auscultation bilaterally [Heart Rate And Rhythm] : heart rate was normal and rhythm regular [Heart Sounds] : normal S1 and S2 [Heart Sounds Gallop] : no gallops [Murmurs] : no murmurs [Heart Sounds Pericardial Friction Rub] : no pericardial rub [Bowel Sounds] : normal bowel sounds [Abdomen Soft] : soft [Abdomen Tenderness] : non-tender [Abnormal Walk] : normal gait [Involuntary Movements] : no involuntary movements were seen [Skin Color & Pigmentation] : normal skin color and pigmentation [Skin Turgor] : normal skin turgor [Oriented To Time, Place, And Person] : oriented to person, place, and time [Impaired Insight] : insight and judgment were intact [Affect] : the affect was normal [Mood] : the mood was normal [FreeTextEntry1] : small bilateral distal LE edema, bilateral lower extremity varicosities

## 2023-06-13 NOTE — REASON FOR VISIT
"LVM for parent intending to explain the following;  Patient is being seen today for \"heat flashes\" with Hodan Riley. Hodan noticed that they are overdue for a well check and suggested we call them to offer to move them over to one of Dr. Díaz open slots next week where they can take care of their well check and talk about heat flash concerns all in one visit.   Did not leave detailed message. Asked for a CB.    " [Follow-Up] : a follow-up visit [Family Member] : family member [Other: _____] : [unfilled] Awake/Alert

## 2023-06-13 NOTE — REVIEW OF SYSTEMS
[Cough] : cough [Itching] : itching [Easy Bruising] : a tendency for easy bruising [Negative] : Endocrine [FreeTextEntry6] : this is chronic, "a little cough", that has been present for many years [FreeTextEntry8] : nocturia x 1-2 [FreeTextEntry9] : right calf pain at times [de-identified] : improved on the lower dose of Eliquis  [de-identified] : a little unsteady on her feet after standing at times, no falls.  This is not new.

## 2023-06-13 NOTE — ASSESSMENT
[FreeTextEntry1] : Omayra Summers is an 83-year female who has a past medical history significant for atrial fibrillation on Eliquis, chronic diastolic heart failure, severe  mitral regurgitation s/p valve clip on 3 occasions,  hypothyroidism, gout, and stage III CKD who I saw during an admission to [facility] in July 2022 for acute on chronic kidney disease.  Ms. Summers was taking both metolazone and BID torsemide prior to admission.  They were held and the renal function improved. \par \par Hospital workup:\par \par Aldosterone (serum)                 52.8 ng/dL   (< 23.2 normal)\par Urine protein/creatinine ratio     378 mg per gram\par \par IMPRESSION:\par \par (1) Chronic kidney disease.  Renal U/S from the summer of 2022 demonstrated a R. kidney measuring 8.2 cm, and left kidney measuring 7.8 cm with no hydronephrosis.  The BUN/creatinine levels have trended as follows: 32/1.1 (09/01/2020) --> 37/1.23 (08/04/2022), 53/1.53 (12/09/2022), 50/1.85 (06/06/2023).  The eGFR decreased from  54 to 27 mL per minute.  The urine albumin to creatinine ratio is too low to calculate. \par \par Ms. Summers is taking spironolactone 25 mg daily and torsemide 40 mg twice daily.  The increase in the serum creatinine appears to be related to diuretic use. \par \par (2) Hyponatremia.  During the hospital admission I thought this was due to oral fluids in the presence of diuretics. She maintains an oral fluid restriction of 63892 mL per day.  The recent serum sodium was 140 meq per liter. \par \par (3) Congestive heart failure.  No evidence of CHF on physical exam. \par \par (4) Hyperparathyroidism.  The iPTH is 299 pg/mL (normal 15-65).  The 25 hydroxy vitamin D level is in the normal range (50.2 ng/mL).  Elevated alkaline phosphatase. This can be seen in hyperparathyroidism. \par \par (5) Atrial fibrillation.  Currently with a regular rhythm on exam. \par \par RECOMMENDATIONS:\par \par (1) Continue current diuretic regimen\par       Remove fluid restriction.  Stay well hydrated.  Repeat BMP in 1 month. \par       If the serum creatinine does not improve we may need to consider lowering the diuretic dose.\par \par (2) Continue to keep salt OUT of the diet.\par \par (3) Start calcitriol 0.25 mcg PO three times per week. Stop other vitamin D supplements. \par \par (4)  Avoid the use of NSAIDS (ibuprofen, Motrin, Aleve,Celebrex, etc.).  Okay to use Tylenol.\par \par I will call Ms. Summers with her lab results in 1 month. \par \par If all is stable, I will see Ms. Summers back in 6 months. \par \par \par

## 2023-07-03 LAB
25(OH)D3 SERPL-MCNC: 50.2 NG/ML
ALBUMIN SERPL ELPH-MCNC: 4.2 G/DL
ALP BLD-CCNC: 227 U/L
ALT SERPL-CCNC: 17 U/L
ANION GAP SERPL CALC-SCNC: 12 MMOL/L
AST SERPL-CCNC: 31 U/L
BILIRUB SERPL-MCNC: 1.1 MG/DL
BUN SERPL-MCNC: 50 MG/DL
CALCIUM SERPL-MCNC: 9.9 MG/DL
CALCIUM SERPL-MCNC: 9.9 MG/DL
CHLORIDE SERPL-SCNC: 99 MMOL/L
CO2 SERPL-SCNC: 31 MMOL/L
CREAT SERPL-MCNC: 1.85 MG/DL
CREAT SPEC-SCNC: 40 MG/DL
EGFR: 27 ML/MIN/1.73M2
GLUCOSE SERPL-MCNC: 84 MG/DL
MICROALBUMIN 24H UR DL<=1MG/L-MCNC: <1.2 MG/DL
MICROALBUMIN/CREAT 24H UR-RTO: NORMAL MG/G
PARATHYROID HORMONE INTACT: 299 PG/ML
PHOSPHATE SERPL-MCNC: 4 MG/DL
POTASSIUM SERPL-SCNC: 4.3 MMOL/L
PROT SERPL-MCNC: 7.6 G/DL
SODIUM SERPL-SCNC: 142 MMOL/L
URATE SERPL-MCNC: 4.7 MG/DL

## 2023-07-12 ENCOUNTER — APPOINTMENT (OUTPATIENT)
Dept: HOME HEALTH SERVICES | Facility: HOME HEALTH | Age: 83
End: 2023-07-12

## 2023-07-12 VITALS
DIASTOLIC BLOOD PRESSURE: 70 MMHG | HEART RATE: 72 BPM | RESPIRATION RATE: 18 BRPM | OXYGEN SATURATION: 99 % | SYSTOLIC BLOOD PRESSURE: 110 MMHG | WEIGHT: 147 LBS | BODY MASS INDEX: 29.69 KG/M2

## 2023-07-12 RX ORDER — CHOLECALCIFEROL (VITAMIN D3) 25 MCG
TABLET ORAL
Refills: 0 | Status: DISCONTINUED | COMMUNITY
End: 2023-07-12

## 2023-08-02 ENCOUNTER — APPOINTMENT (OUTPATIENT)
Dept: HOME HEALTH SERVICES | Facility: HOME HEALTH | Age: 83
End: 2023-08-02

## 2023-08-30 ENCOUNTER — APPOINTMENT (OUTPATIENT)
Dept: HOME HEALTH SERVICES | Facility: HOME HEALTH | Age: 83
End: 2023-08-30
Payer: MEDICARE

## 2023-08-30 VITALS — HEART RATE: 74 BPM | OXYGEN SATURATION: 98 % | DIASTOLIC BLOOD PRESSURE: 74 MMHG | SYSTOLIC BLOOD PRESSURE: 112 MMHG

## 2023-08-30 PROCEDURE — 99349 HOME/RES VST EST MOD MDM 40: CPT

## 2023-08-30 NOTE — HISTORY OF PRESENT ILLNESS
[Patient] : patient [Family Member] : family member [FreeTextEntry1] : Gait instability [FreeTextEntry2] : Patient being seen for follow up patient states that her she is doing well, weight this morning was 147.  Patient in kitchen at this visit with grandchildren making meatballs.  Patient states sleeping well, eating well, moving bowels regularly.

## 2023-08-30 NOTE — PHYSICAL EXAM
[No Acute Distress] : no acute distress [Well Nourished] : well nourished [Normal Sclera/Conjunctiva] : normal sclera/conjunctiva [PERRL] : pupils equal, round and reactive to light [Normal Outer Ear/Nose] : the ears and nose were normal in appearance [No JVD] : no jugular venous distention [Supple] : the neck was supple [No LAD] : no lymphadenopathy [No Respiratory Distress] : no respiratory distress [Clear to Auscultation] : lungs were clear to auscultation bilaterally [No Accessory Muscle Use] : no accessory muscle use [Normal Rate] : heart rate was normal  [Normal Bowel Sounds] : normal bowel sounds [Non Tender] : non-tender [Soft] : abdomen soft [Normal Gait] : normal gait [No Joint Swelling] : no joint swelling seen [No Rash] : no rash [No Skin Lesions] : no skin lesions [Oriented x3] : oriented to person, place, and time [Normal Affect] : the affect was normal [de-identified] : Irregular/Irregular no edema to b/l lower extremities

## 2023-10-12 ENCOUNTER — LABORATORY RESULT (OUTPATIENT)
Age: 83
End: 2023-10-12

## 2023-11-03 ENCOUNTER — NON-APPOINTMENT (OUTPATIENT)
Age: 83
End: 2023-11-03

## 2023-11-08 ENCOUNTER — APPOINTMENT (OUTPATIENT)
Dept: HOME HEALTH SERVICES | Facility: HOME HEALTH | Age: 83
End: 2023-11-08

## 2023-11-08 VITALS
TEMPERATURE: 98.1 F | SYSTOLIC BLOOD PRESSURE: 118 MMHG | BODY MASS INDEX: 30.34 KG/M2 | RESPIRATION RATE: 18 BRPM | WEIGHT: 150.2 LBS | OXYGEN SATURATION: 95 % | DIASTOLIC BLOOD PRESSURE: 78 MMHG | HEART RATE: 78 BPM

## 2023-12-19 ENCOUNTER — APPOINTMENT (OUTPATIENT)
Dept: NEPHROLOGY | Facility: CLINIC | Age: 83
End: 2023-12-19
Payer: MEDICARE

## 2023-12-19 VITALS
HEART RATE: 76 BPM | TEMPERATURE: 97.3 F | HEIGHT: 59 IN | OXYGEN SATURATION: 94 % | SYSTOLIC BLOOD PRESSURE: 124 MMHG | BODY MASS INDEX: 31.25 KG/M2 | WEIGHT: 155 LBS | DIASTOLIC BLOOD PRESSURE: 72 MMHG | RESPIRATION RATE: 18 BRPM

## 2023-12-19 DIAGNOSIS — R82.998 OTHER ABNORMAL FINDINGS IN URINE: ICD-10-CM

## 2023-12-19 DIAGNOSIS — N17.9 ACUTE KIDNEY FAILURE, UNSPECIFIED: ICD-10-CM

## 2023-12-19 PROCEDURE — 99214 OFFICE O/P EST MOD 30 MIN: CPT

## 2023-12-19 RX ORDER — TORSEMIDE 20 MG/1
20 TABLET ORAL DAILY
Qty: 180 | Refills: 3 | Status: DISCONTINUED | COMMUNITY
Start: 2022-08-03 | End: 2023-12-19

## 2023-12-19 RX ORDER — TORSEMIDE 20 MG/1
20 TABLET ORAL
Refills: 0 | Status: ACTIVE | COMMUNITY

## 2023-12-19 NOTE — ASSESSMENT
[FreeTextEntry1] : Omayra Summers is an 83-year female who has a past medical history significant for atrial fibrillation on Eliquis, chronic diastolic heart failure, severe mitral regurgitation s/p valve clip on 3 occasions, hypothyroidism, gout, and stage III CKD who I saw during an admission to [facility] in July 2022 for acute on chronic kidney disease. Ms. Summers was taking both metolazone and BID torsemide prior to admission. They were held and the renal function improved.  Hospital workup:  Aldosterone (serum) 52.8 ng/dL (< 23.2 normal) Urine protein/creatinine ratio 378 mg per gram  IMPRESSION:  (1) Chronic kidney disease. Renal U/S from the summer of 2022 demonstrated a R. kidney measuring 8.2 cm, and left kidney measuring 7.8 cm with no hydronephrosis. The BUN/creatinine levels have trended as follows: 32/1.1 (09/01/2020) --> 37/1.23 (08/04/2022), 53/1.53 (12/09/2022), 50/1.85 (06/06/2023), and most recently 68/2.4 (10/13/2023). The torsemide dose was lowered from 40 mg daily to 40 mg alternating with 20 mg daily on this date. The urine albumin to creatinine ratio was too low to calculate. The recent value was 80 mg per gram (10/13/2023).   The urinalysis at this time had many white cells (infection? interstitial nephritis? etc.).   (2) Hyponatremia. During the hospital admission I thought this was due to oral fluids in the presence of diuretics. She is no longer on fluid restriction. The recent serum sodium was 136 meq per liter.  (3) Congestive heart failure. No evidence of CHF on physical exam.  (4) Hyperparathyroidism. The iPTH increased from 299 pg/mL to 346.8 pg per mL.  She has been taking calcitriol since 06/14/2023.  This could be the reason for the itching.   (5) Atrial fibrillation.  RECOMMENDATIONS:  (1) Continue current diuretic regimen (2) Change calcitriol dosing from every other day to once daily.  (3) Repeat renal function tests (along with urine studies) after the new year. (4) Avoid the use of NSAIDS (ibuprofen, Motrin, Aleve, Celebrex, etc.). Okay to use Tylenol.  Ms. Summers will return for follow up in 3 months.  If her renal function worsens, will see her sooner.        If all is stable, I will see Ms. Summers back in 6 months.              Plan CKD (chronic kidney disease), stage III Albumin/Creatinine Ratio, Random Urine; Status:Active; Requested for:01Nov2023; Follow-up visit in 6 months Outpatient  .  Status: Hold For - Scheduling  Requested for: 13Dec2023 Basic Metabolic Panel; Status:Active; Requested for:13Jun2023; Comprehensive Metabolic Panel; Status:Active; Requested for:01Nov2023; Phosphorus, Serum; Status:Active; Requested for:01Nov2023; Urinalysis with Microscopic; Status:Active; Requested for:01Nov2023; Gout Uric Acid, Serum; Status:Active; Requested for:01Nov2023; Health Maintenance Complete Blood Count w DIFF; Status:Active; Requested for:01Nov2023; Hyperparathyroidism Start: Calcitriol 0.25 MCG Oral Capsule; take 1 capsule by mouth every other day Parathyroid Hormone Intact, Serum; Status:Active; Requested for:01Nov2023; Parathyroid Hormone Intact, Serum; Status:Active; Requested for:13Jun2023; Vitamin D, 25-Hydroxy; Status:Active; Requested for:01Nov2023;           Electronically signed by : ANKITA ROSE MD; Jun 13 2023  2:26PM EST (Author)

## 2023-12-19 NOTE — HISTORY OF PRESENT ILLNESS
[FreeTextEntry1] : Omayra Summers is an 83-year female originally from [facility] who has a past medical history significant for atrial fibrillation on Eliquis, chronic diastolic heart failure, severe mitral regurgitation s/p valve clip on 3 occasions, hypothyroidism, gout, and stage III CKD who I saw during an admission to [facility] in July 2022 for acute on chronic kidney disease.  Ms. Summers was taking both metolazone and BID torsemide prior to admission.  They were held and the renal function improved.     Ms. Summers is here for follow up.  She has been doing well.  There have no episodes of decompensated heart failure.  She last saw Dr. Eduard Burnham in October 2023.  The torsemide dose was changed from 40 mg daily to 40 mg alternating with 20 mg daily.

## 2023-12-19 NOTE — REVIEW OF SYSTEMS
[Cough] : cough [Itching] : itching [Easy Bruising] : a tendency for easy bruising [Negative] : Endocrine [FreeTextEntry6] : this is chronic, "a little cough", that has been present for many years [FreeTextEntry8] : nocturia > 2 times per night [FreeTextEntry9] : right calf pain at times [de-identified] : skin burns and itches on the bottoms of the feet [de-identified] : a little unsteady on her feet after standing at times, no falls.  This is not new.

## 2023-12-19 NOTE — PHYSICAL EXAM
[General Appearance - Alert] : alert [General Appearance - In No Acute Distress] : in no acute distress [Sclera] : the sclera and conjunctiva were normal [Extraocular Movements] : extraocular movements were intact [Neck Appearance] : the appearance of the neck was normal [] : no respiratory distress [Respiration, Rhythm And Depth] : normal respiratory rhythm and effort [Exaggerated Use Of Accessory Muscles For Inspiration] : no accessory muscle use [Auscultation Breath Sounds / Voice Sounds] : lungs were clear to auscultation bilaterally [Heart Rate And Rhythm] : heart rate was normal and rhythm regular [Heart Sounds] : normal S1 and S2 [Heart Sounds Gallop] : no gallops [Murmurs] : no murmurs [Heart Sounds Pericardial Friction Rub] : no pericardial rub [FreeTextEntry1] : small bilateral distal LE edema, bilateral lower extremity varicosities [Bowel Sounds] : normal bowel sounds [Abdomen Soft] : soft [Abdomen Tenderness] : non-tender [Abnormal Walk] : normal gait [Involuntary Movements] : no involuntary movements were seen [Skin Color & Pigmentation] : normal skin color and pigmentation [Skin Turgor] : normal skin turgor [Oriented To Time, Place, And Person] : oriented to person, place, and time [Impaired Insight] : insight and judgment were intact [Affect] : the affect was normal [Mood] : the mood was normal

## 2023-12-22 ENCOUNTER — APPOINTMENT (OUTPATIENT)
Dept: HOME HEALTH SERVICES | Facility: HOME HEALTH | Age: 83
End: 2023-12-22
Payer: MEDICARE

## 2023-12-22 VITALS
BODY MASS INDEX: 30.3 KG/M2 | SYSTOLIC BLOOD PRESSURE: 130 MMHG | WEIGHT: 150 LBS | HEART RATE: 54 BPM | DIASTOLIC BLOOD PRESSURE: 90 MMHG | OXYGEN SATURATION: 96 %

## 2023-12-22 PROCEDURE — 99349 HOME/RES VST EST MOD MDM 40: CPT

## 2023-12-22 NOTE — PHYSICAL EXAM
[No Acute Distress] : no acute distress [Well Nourished] : well nourished [Normal Sclera/Conjunctiva] : normal sclera/conjunctiva [PERRL] : pupils equal, round and reactive to light [Normal Outer Ear/Nose] : the ears and nose were normal in appearance [No JVD] : no jugular venous distention [Supple] : the neck was supple [No LAD] : no lymphadenopathy [No Respiratory Distress] : no respiratory distress [Clear to Auscultation] : lungs were clear to auscultation bilaterally [No Accessory Muscle Use] : no accessory muscle use [Normal Rate] : heart rate was normal  [Normal Bowel Sounds] : normal bowel sounds [Non Tender] : non-tender [Soft] : abdomen soft [Normal Gait] : normal gait [No Joint Swelling] : no joint swelling seen [No Rash] : no rash [No Skin Lesions] : no skin lesions [Oriented x3] : oriented to person, place, and time [Normal Affect] : the affect was normal [de-identified] : Irregular/Irregular no edema to b/l lower extremities

## 2023-12-22 NOTE — HISTORY OF PRESENT ILLNESS
[Patient] : patient [Family Member] : family member [FreeTextEntry1] : Gait instability [FreeTextEntry2] : Patient being seen for follow up for CHF, was seen by Dr. Martino and medications adjusted due to decreased kidney function.  c/o small rash to rt axilla that started about a week ago, have been using hydrocortisone cream and slight improvement.  Weight has been stable. Appetite is good Moving bowels well.

## 2024-01-19 LAB
25(OH)D3 SERPL-MCNC: 40.4 NG/ML
CREAT SPEC-SCNC: 51 MG/DL
MICROALBUMIN 24H UR DL<=1MG/L-MCNC: 5.4 MG/DL
MICROALBUMIN/CREAT 24H UR-RTO: 105 MG/G
PHOSPHATE SERPL-MCNC: 3.6 MG/DL

## 2024-01-30 ENCOUNTER — APPOINTMENT (OUTPATIENT)
Dept: HOME HEALTH SERVICES | Facility: HOME HEALTH | Age: 84
End: 2024-01-30

## 2024-01-30 VITALS
RESPIRATION RATE: 18 BRPM | HEART RATE: 66 BPM | TEMPERATURE: 97.6 F | BODY MASS INDEX: 31.02 KG/M2 | SYSTOLIC BLOOD PRESSURE: 118 MMHG | WEIGHT: 153.6 LBS | DIASTOLIC BLOOD PRESSURE: 78 MMHG | OXYGEN SATURATION: 97 %

## 2024-03-26 ENCOUNTER — APPOINTMENT (OUTPATIENT)
Dept: NEPHROLOGY | Facility: CLINIC | Age: 84
End: 2024-03-26
Payer: MEDICARE

## 2024-03-26 VITALS
DIASTOLIC BLOOD PRESSURE: 76 MMHG | SYSTOLIC BLOOD PRESSURE: 122 MMHG | RESPIRATION RATE: 18 BRPM | WEIGHT: 160 LBS | HEART RATE: 79 BPM | BODY MASS INDEX: 32.25 KG/M2 | TEMPERATURE: 97.5 F | OXYGEN SATURATION: 96 % | HEIGHT: 59 IN

## 2024-03-26 VITALS
RESPIRATION RATE: 18 BRPM | HEART RATE: 79 BPM | BODY MASS INDEX: 32.25 KG/M2 | TEMPERATURE: 97.5 F | WEIGHT: 160 LBS | DIASTOLIC BLOOD PRESSURE: 76 MMHG | SYSTOLIC BLOOD PRESSURE: 122 MMHG | OXYGEN SATURATION: 96 % | HEIGHT: 59 IN

## 2024-03-26 DIAGNOSIS — E21.3 HYPERPARATHYROIDISM, UNSPECIFIED: ICD-10-CM

## 2024-03-26 DIAGNOSIS — L29.9 PRURITUS, UNSPECIFIED: ICD-10-CM

## 2024-03-26 PROCEDURE — 99214 OFFICE O/P EST MOD 30 MIN: CPT

## 2024-03-26 NOTE — REVIEW OF SYSTEMS
[Recent Weight Gain (___ Lbs)] : recent [unfilled] ~Ulb weight gain [Cough] : cough [Itching] : itching [As Noted in HPI] : as noted in HPI [Easy Bruising] : a tendency for easy bruising [Negative] : Endocrine [FreeTextEntry6] : this is chronic, "a little cough", that has been present for many years [FreeTextEntry8] : nocturia > 2 times per night (this is without change) [FreeTextEntry9] : knee pain (left knee)- recently walking more.  [de-identified] : a little unsteady on her feet after standing at times, no falls.  This is not new.

## 2024-03-26 NOTE — HISTORY OF PRESENT ILLNESS
[FreeTextEntry1] : Omayra Summers is an 83-year female originally from [facility] who has a past medical history significant for atrial fibrillation on Eliquis, chronic diastolic heart failure, severe mitral regurgitation s/p valve clip on 3 occasions, hypothyroidism, gout, and stage III CKD who I saw during an admission to [facility] in July 2022 for acute on chronic kidney disease.  Ms. Summers was taking both metolazone and BID torsemide prior to admission.  They were held and the renal function improved.     Ms. Summers is here for follow up.  She is accompanied by her daughter.  I last saw Ms. Baxter on 12/19/2023.  Her calcitriol dosing frequency was increased.  This improved her itching.  This worsened recently although her diet has changed recently as there have been a number of family occasions. There have no episodes of decompensated heart failure.  She last saw Dr. Eduard Burnham on 02/09/2024. There were no medication changes made during that visit.

## 2024-03-26 NOTE — PHYSICAL EXAM
[General Appearance - Alert] : alert [General Appearance - In No Acute Distress] : in no acute distress [Sclera] : the sclera and conjunctiva were normal [Extraocular Movements] : extraocular movements were intact [Neck Appearance] : the appearance of the neck was normal [] : no respiratory distress [Respiration, Rhythm And Depth] : normal respiratory rhythm and effort [Exaggerated Use Of Accessory Muscles For Inspiration] : no accessory muscle use [Auscultation Breath Sounds / Voice Sounds] : lungs were clear to auscultation bilaterally [Heart Rate And Rhythm] : heart rate was normal and rhythm regular [Heart Sounds] : normal S1 and S2 [Heart Sounds Gallop] : no gallops [Murmurs] : no murmurs [Heart Sounds Pericardial Friction Rub] : no pericardial rub [FreeTextEntry1] : small bilateral distal LE edema (wearing compression stockings) [Bowel Sounds] : normal bowel sounds [Abdomen Soft] : soft [Abnormal Walk] : normal gait [Abdomen Tenderness] : non-tender [Involuntary Movements] : no involuntary movements were seen [Skin Color & Pigmentation] : normal skin color and pigmentation [Skin Turgor] : normal skin turgor [Impaired Insight] : insight and judgment were intact [Oriented To Time, Place, And Person] : oriented to person, place, and time [Affect] : the affect was normal [Over the Past 2 Weeks, Have You Felt Down, Depressed, or Hopeless?] : 1.) Over the past 2 weeks, have you felt down, depressed, or hopeless? No [Mood] : the mood was normal [Over the Past 2 Weeks, Have You Felt Little Interest or Pleasure Doing Things?] : 2.) Over the past 2 weeks, have you felt little interest or pleasure doing things? No

## 2024-03-26 NOTE — ASSESSMENT
[FreeTextEntry1] :  THE LAB STUDIES ABOVE WERE ORDERED ON 12/19/2023 AND RESULTED ON 01/12/2024  Omarya Summers is an 83-year female who has a past medical history significant for atrial fibrillation on Eliquis, chronic diastolic heart failure, severe mitral regurgitation s/p valve clip on 3 occasions, hypothyroidism, gout, and stage III CKD who I saw during an admission to [facility] in July 2022 for acute on chronic kidney disease. Ms. Summers was taking both metolazone and BID torsemide prior to admission. They were held and the renal function improved.  Hospital workup:  Aldosterone (serum) 52.8 ng/dL (< 23.2 normal) Urine protein/creatinine ratio 378 mg per gram  IMPRESSION:  (1) Chronic kidney disease. Renal U/S from the summer of 2022 demonstrated a R. kidney measuring 8.2 cm, and left kidney measuring 7.8 cm with no hydronephrosis. The BUN/creatinine levels have trended as follows: 32/1.1 (09/01/2020) --> 37/1.23 (08/04/2022), 53/1.53 (12/09/2022), 50/1.85 (06/06/2023), 68/2.4 (10/13/2023). The torsemide dose was lowered from 40 mg daily to 40 mg alternating with 20 mg daily on this date.  The BUN/creatinine levels were later 58/1.7 (01/19/2024). The recent UACR has trended as follows: 80 mg/g (10/13/2023), 105 mg/g (01/12/2024).    (2) Hyponatremia. During the hospital admission I thought this was due to oral fluids in the presence of diuretics. She is no longer on fluid restriction. The recent serum sodium was 140 meq per liter.  (3) Congestive heart failure. No evidence of CHF on physical exam.  (4) Hyperparathyroidism. The iPTH increased from 299 pg/mL to 346.8 pg per mL. Ms. Summers's calcitriol dosing frequency was changed to daily following the last test.  I do not have a recent value.   (5) Atrial fibrillation. Rate controlled.   RECOMMENDATIONS:  (1) Continue current diuretic regimen (2) Continue taking calcitriol daily. Check iPTH and 25 hydroxy vitamin D levels. (3) Repeat renal function tests (along with urine studies)  in 3 months. (4) Avoid the use of NSAIDS (ibuprofen, Motrin, Aleve, Celebrex, etc.). Okay to use Tylenol.  Ms. Summers will return for follow up in 3 months.       If all is stable, I will see Ms. Summers back in 6 months.         Plan CKD (chronic kidney disease), stage III Albumin/Creatinine Ratio, Random Urine; Status:Active; Requested for:01Nov2023; Follow-up visit in 6 months Outpatient. Status: Hold For - Scheduling Requested for: 13Dec2023 Basic Metabolic Panel; Status:Active; Requested for:13Jun2023; Comprehensive Metabolic Panel; Status:Active; Requested for:01Nov2023; Phosphorus, Serum; Status:Active; Requested for:01Nov2023; Urinalysis with Microscopic; Status:Active; Requested for:01Nov2023; Gout Uric Acid, Serum; Status:Active; Requested for:01Nov2023; Health Maintenance Complete Blood Count w DIFF; Status:Active; Requested for:01Nov2023; Hyperparathyroidism Start: Calcitriol 0.25 MCG Oral Capsule; take 1 capsule by mouth every other day Parathyroid Hormone Intact, Serum; Status:Active; Requested for:01Nov2023; Parathyroid Hormone Intact, Serum; Status:Active; Requested for:13Jun2023; Vitamin D, 25-Hydroxy; Status:Active; Requested for:01Nov2023;     Electronically signed by : ANKITA ROSE MD; Jun 13 2023 2:26PM EST (Author).        Plan CKD (chronic kidney disease), stage III Follow-up visit in 3 months Outpatient  .  Status: Hold For - Scheduling  Requested for: 19Mar2024 Hyperparathyroidism Changed: From  Calcitriol 0.25 MCG Oral Capsule take 1 capsule by mouth every other day To Calcitriol 0.25 MCG Oral Capsule Take 1 tablet daily Vitamin D, 25-Hydroxy; Status:Active; Requested for:19Dec2023;          Electronically signed by : ANKITA ROSE MD; Dec 19 2023  6:14PM Eastern Standard Time (Author)

## 2024-05-10 ENCOUNTER — APPOINTMENT (OUTPATIENT)
Dept: HOME HEALTH SERVICES | Facility: HOME HEALTH | Age: 84
End: 2024-05-10
Payer: MEDICARE

## 2024-05-10 DIAGNOSIS — N18.30 CHRONIC KIDNEY DISEASE, STAGE 3 UNSPECIFIED: ICD-10-CM

## 2024-05-10 DIAGNOSIS — I48.91 UNSPECIFIED ATRIAL FIBRILLATION: ICD-10-CM

## 2024-05-10 DIAGNOSIS — I50.32 CHRONIC DIASTOLIC (CONGESTIVE) HEART FAILURE: ICD-10-CM

## 2024-05-10 DIAGNOSIS — E03.9 HYPOTHYROIDISM, UNSPECIFIED: ICD-10-CM

## 2024-05-10 PROCEDURE — 99349 HOME/RES VST EST MOD MDM 40: CPT

## 2024-05-10 RX ORDER — LEVOTHYROXINE SODIUM 0.05 MG/1
50 TABLET ORAL DAILY
Qty: 90 | Refills: 3 | Status: ACTIVE | COMMUNITY
Start: 2022-06-27 | End: 1900-01-01

## 2024-05-10 NOTE — HEALTH RISK ASSESSMENT
[Independent] : feeding [Some assistance needed] : dressing [No falls in past year] : Patient reported no falls in the past year [No] : The patient does not have visual impairment [TimeGetUpGo] : 6

## 2024-05-10 NOTE — HISTORY OF PRESENT ILLNESS
[Patient] : patient [Family Member] : family member [FreeTextEntry1] : Gait instability [FreeTextEntry2] : Patient being seen for follow up for CHF, was seen by Dr. Martino and medications adjusted due to decreased kidney function.  Weight has been stable. Appetite is good Moving bowels well.  Sleeping "so so" No falls AOx3 No behavior issues

## 2024-05-10 NOTE — PHYSICAL EXAM
[No Acute Distress] : no acute distress [Well Nourished] : well nourished [Normal Sclera/Conjunctiva] : normal sclera/conjunctiva [PERRL] : pupils equal, round and reactive to light [Normal Outer Ear/Nose] : the ears and nose were normal in appearance [No JVD] : no jugular venous distention [Supple] : the neck was supple [No LAD] : no lymphadenopathy [No Respiratory Distress] : no respiratory distress [Clear to Auscultation] : lungs were clear to auscultation bilaterally [No Accessory Muscle Use] : no accessory muscle use [Normal Rate] : heart rate was normal  [Normal Bowel Sounds] : normal bowel sounds [Non Tender] : non-tender [Soft] : abdomen soft [Normal Gait] : normal gait [No Joint Swelling] : no joint swelling seen [No Rash] : no rash [No Skin Lesions] : no skin lesions [Oriented x3] : oriented to person, place, and time [Normal Affect] : the affect was normal [de-identified] : Irregular/Irregular no edema to b/l lower extremities

## 2024-05-16 ENCOUNTER — NON-APPOINTMENT (OUTPATIENT)
Age: 84
End: 2024-05-16

## 2024-06-07 ENCOUNTER — RX RENEWAL (OUTPATIENT)
Age: 84
End: 2024-06-07

## 2024-06-28 ENCOUNTER — APPOINTMENT (OUTPATIENT)
Dept: HOME HEALTH SERVICES | Facility: HOME HEALTH | Age: 84
End: 2024-06-28

## 2024-06-28 VITALS
RESPIRATION RATE: 18 BRPM | DIASTOLIC BLOOD PRESSURE: 68 MMHG | SYSTOLIC BLOOD PRESSURE: 128 MMHG | TEMPERATURE: 98.1 F | HEART RATE: 72 BPM | OXYGEN SATURATION: 96 %

## 2024-07-19 ENCOUNTER — NON-APPOINTMENT (OUTPATIENT)
Age: 84
End: 2024-07-19

## 2024-07-23 ENCOUNTER — APPOINTMENT (OUTPATIENT)
Dept: NEPHROLOGY | Facility: CLINIC | Age: 84
End: 2024-07-23
Payer: MEDICARE

## 2024-07-23 VITALS
BODY MASS INDEX: 31.65 KG/M2 | OXYGEN SATURATION: 96 % | WEIGHT: 157 LBS | HEART RATE: 72 BPM | HEIGHT: 59 IN | DIASTOLIC BLOOD PRESSURE: 78 MMHG | SYSTOLIC BLOOD PRESSURE: 120 MMHG | TEMPERATURE: 97.2 F

## 2024-07-23 VITALS
BODY MASS INDEX: 31.65 KG/M2 | WEIGHT: 157 LBS | OXYGEN SATURATION: 96 % | HEIGHT: 59 IN | HEART RATE: 72 BPM | SYSTOLIC BLOOD PRESSURE: 120 MMHG | DIASTOLIC BLOOD PRESSURE: 78 MMHG

## 2024-07-23 DIAGNOSIS — N18.30 CHRONIC KIDNEY DISEASE, STAGE 3 UNSPECIFIED: ICD-10-CM

## 2024-07-23 DIAGNOSIS — E21.3 HYPERPARATHYROIDISM, UNSPECIFIED: ICD-10-CM

## 2024-07-23 PROCEDURE — 99214 OFFICE O/P EST MOD 30 MIN: CPT

## 2024-07-23 NOTE — ASSESSMENT
[FreeTextEntry1] : ! Omayra Summers is an 84-year-old female who has a past medical history significant for atrial fibrillation on Eliquis, chronic diastolic heart failure, severe mitral regurgitation s/p valve clip on 3 occasions, hypothyroidism, gout, and stage III CKD who I saw during an admission to [facility] in July 2022 for acute on chronic kidney disease. Ms. Summers was taking both metolazone and BID torsemide prior to admission. They were held and the renal function improved.  Hospital workup: Aldosterone (serum) 52.8 ng/dL (< 23.2 normal) Urine protein/creatinine ratio 378 mg per gram  IMPRESSION:  (1) Chronic kidney disease. Renal U/S from the summer of 2022 demonstrated a R. kidney measuring 8.2 cm, and left kidney measuring 7.8 cm with no hydronephrosis. The BUN/creatinine levels have trended as follows: 32/1.1 (09/01/2020) --> 37/1.23 (08/04/2022), 53/1.53 (12/09/2022), 50/1.85 (06/06/2023), 68/2.4 (10/13/2023), --> decrease in diuretic dose -->  58/1.7 (01/19/2024)--> 68/2.02 (07/09/2024). The torsemide dose continues at 40 mg alternating with 20 mg daily. I think that Ms. Summers has not been hydrating well.  The recent UACR has trended as follows:80 mg/g (10/13/2023), 105 mg/g (01/12/2024), 73 mg/g (07/10/2024).  (2) Hyponatremia. During the hospital admission I thought this was due to oral fluids in the presence of diuretics. She is no longer on fluid restriction. The recent serum sodium was 135 meq per liter.  (3) Congestive heart failure. No evidence of CHF on physical exam.  (4) Hyperparathyroidism. The iPTH increased from 299 pg/mL to 346.8 pg per mL. Ms. Summers's calcitriol dosing frequency was changed to daily following the last test. The most recent iPTH is 297 pg/mL.   (5) Atrial fibrillation. Rate controlled.  RECOMMENDATIONS:  (1) Continue current diuretic regimen (2) Increase calcitriol to 0.25 mcg alternating with 0.5 mcg daily. (3) Improve hydration.  Drink at least 48 ounces of water daily.  (4) Repeat renal function tests (along with urine studies) in 1 month. (5) Avoid the use of NSAIDS (ibuprofen, Motrin, Aleve, Celebrex, etc.). Okay to use Tylenol.  Ms. Summers will return for follow up in 3 months.       If all is stable, I will see Ms. Summers back in 6 months.         Plan CKD (chronic kidney disease), stage III Albumin/Creatinine Ratio, Random Urine; Status:Active; Requested for:01Nov2023; Follow-up visit in 6 months Outpatient. Status: Hold For - Scheduling Requested for: 13Dec2023 Basic Metabolic Panel; Status:Active; Requested for:13Jun2023; Comprehensive Metabolic Panel; Status:Active; Requested for:01Nov2023; Phosphorus, Serum; Status:Active; Requested for:01Nov2023; Urinalysis with Microscopic; Status:Active; Requested for:01Nov2023; Gout Uric Acid, Serum; Status:Active; Requested for:01Nov2023; Health Maintenance Complete Blood Count w DIFF; Status:Active; Requested for:01Nov2023; Hyperparathyroidism Start: Calcitriol 0.25 MCG Oral Capsule; take 1 capsule by mouth every other day Parathyroid Hormone Intact, Serum; Status:Active; Requested for:01Nov2023; Parathyroid Hormone Intact, Serum; Status:Active; Requested for:13Jun2023; Vitamin D, 25-Hydroxy; Status:Active; Requested for:01Nov2023;     Electronically signed by : ANKITA ROSE MD; Jun 13 2023 2:26PM EST (Author).    Plan CKD (chronic kidney disease), stage III Follow-up visit in 3 months Outpatient. Status: Hold For - Scheduling Requested for: 19Mar2024 Hyperparathyroidism Changed: From Calcitriol 0.25 MCG Oral Capsule take 1 capsule by mouth every other day To Calcitriol 0.25 MCG Oral Capsule Take 1 tablet daily Vitamin D, 25-Hydroxy; Status:Active; Requested for:19Dec2023;    Electronically signed by : ANKITA ROSE MD; Dec 19 2023 6:14PM Eastern Standard Time (Author).        Plan CKD (chronic kidney disease), stage III Albumin/Creatinine Ratio, Random Urine; Status:Active; Requested for:26Mar2024; Follow-up visit in 3 months Outpatient  .  Status: Hold For - Scheduling  Requested for: 26Jun2024 Comprehensive Metabolic Panel; Status:Active; Requested for:26Mar2024; Phosphorus; Status:Active; Requested for:26Mar2024; Protein/Creatinine Ratio, Urine; Status:Active; Requested for:26Mar2024; Urinalysis with Microscopic; Status:Active; Requested for:26Mar2024; Gout Uric Acid, Serum; Status:Active; Requested for:26Mar2024; Health Maintenance CBC with Auto Diff; Status:Active; Requested for:26Mar2024; Hyperparathyroidism Parathyroid Hormone Intact, Serum; Status:Active; Requested for:26Mar2024; Vitamin D, 25-Hydroxy; Status:Active; Requested for:26Mar2024;          Electronically signed by : ANKITA ROSE MD; Mar 26 2024  4:30PM Eastern Standard Time (Author)

## 2024-07-23 NOTE — REVIEW OF SYSTEMS
[Recent Weight Gain (___ Lbs)] : recent [unfilled] ~Ulb weight gain [As Noted in HPI] : as noted in HPI [Itching] : itching [Easy Bruising] : a tendency for easy bruising [Negative] : Endocrine [FreeTextEntry8] : nocturia x 1-2 times per night (this is without change) [FreeTextEntry9] : knee pain (left knee)- recently walking more.  [de-identified] : itching in toes at times. [de-identified] : a little unsteady on her feet after standing at times, no falls.  This is not new.

## 2024-07-23 NOTE — CONSULT LETTER
[Dear  ___] : Dear  [unfilled], [Consult Letter:] : I had the pleasure of evaluating your patient, [unfilled]. [Please see my note below.] : Please see my note below. [Consult Closing:] : Thank you very much for allowing me to participate in the care of this patient.  If you have any questions, please do not hesitate to contact me. [Sincerely,] : Sincerely, [FreeTextEntry3] : [place] [___] : [unfilled]

## 2024-07-23 NOTE — PHYSICAL EXAM
[General Appearance - Alert] : alert [General Appearance - In No Acute Distress] : in no acute distress [Sclera] : the sclera and conjunctiva were normal [Extraocular Movements] : extraocular movements were intact [Neck Appearance] : the appearance of the neck was normal [] : no respiratory distress [Respiration, Rhythm And Depth] : normal respiratory rhythm and effort [Exaggerated Use Of Accessory Muscles For Inspiration] : no accessory muscle use [Auscultation Breath Sounds / Voice Sounds] : lungs were clear to auscultation bilaterally [Heart Sounds] : normal S1 and S2 [Heart Sounds Gallop] : no gallops [Heart Sounds Pericardial Friction Rub] : no pericardial rub [FreeTextEntry1] : bilateral distal LE edema (wearing compression stockings) [Bowel Sounds] : normal bowel sounds [Abdomen Soft] : soft [Abdomen Tenderness] : non-tender [Abnormal Walk] : normal gait [Involuntary Movements] : no involuntary movements were seen [Skin Color & Pigmentation] : normal skin color and pigmentation [Skin Turgor] : normal skin turgor [Oriented To Time, Place, And Person] : oriented to person, place, and time [Impaired Insight] : insight and judgment were intact [Affect] : the affect was normal [Mood] : the mood was normal [Over the Past 2 Weeks, Have You Felt Down, Depressed, or Hopeless?] : 1.) Over the past 2 weeks, have you felt down, depressed, or hopeless? No [Over the Past 2 Weeks, Have You Felt Little Interest or Pleasure Doing Things?] : 2.) Over the past 2 weeks, have you felt little interest or pleasure doing things? No

## 2024-07-23 NOTE — HISTORY OF PRESENT ILLNESS
[FreeTextEntry1] : Omayra Summers is an 84-year-old female originally from [facility] who has a past medical history significant for atrial fibrillation on Eliquis, chronic diastolic heart failure, severe mitral regurgitation s/p valve clip on 3 occasions, hypothyroidism, gout, and stage III CKD who I saw during an admission to [facility] in July 2022 for acute on chronic kidney disease.  Ms. Summers was taking both metolazone and BID torsemide prior to admission.  They were held and the renal function improved.     Ms. Summers is here for follow up.  She is accompanied by her daughter.  I last saw Ms. Summers  in March 2024. She is doing well.  Her daughter, Jackeline, feels her mother could be better hydrated.  There have been no episodes of heart failure.  She is scheduled to see Dr. Burnham next month.

## 2024-08-09 ENCOUNTER — APPOINTMENT (OUTPATIENT)
Dept: HOME HEALTH SERVICES | Facility: HOME HEALTH | Age: 84
End: 2024-08-09

## 2024-08-09 PROCEDURE — 99349 HOME/RES VST EST MOD MDM 40: CPT

## 2024-08-09 NOTE — HISTORY OF PRESENT ILLNESS
[Patient] : patient [Family Member] : family member [FreeTextEntry1] : Gait instability [FreeTextEntry2] : Patient being seen for follow up for CHF, was seen by Dr. Martino adjusted Calcitriol Sitting on couch in living room for visit Weight has been stable. Appetite is good Moving bowels well.  Sleeping "so so" No falls AOx3 No behavior issues

## 2024-08-09 NOTE — PHYSICAL EXAM
[No Acute Distress] : no acute distress [Well Nourished] : well nourished [Normal Sclera/Conjunctiva] : normal sclera/conjunctiva [PERRL] : pupils equal, round and reactive to light [Normal Outer Ear/Nose] : the ears and nose were normal in appearance [No JVD] : no jugular venous distention [Supple] : the neck was supple [No LAD] : no lymphadenopathy [No Respiratory Distress] : no respiratory distress [Clear to Auscultation] : lungs were clear to auscultation bilaterally [No Accessory Muscle Use] : no accessory muscle use [Normal Rate] : heart rate was normal  [Normal Bowel Sounds] : normal bowel sounds [Non Tender] : non-tender [Soft] : abdomen soft [Normal Gait] : normal gait [No Joint Swelling] : no joint swelling seen [No Rash] : no rash [No Skin Lesions] : no skin lesions [Oriented x3] : oriented to person, place, and time [Normal Affect] : the affect was normal [de-identified] : Irregular/Irregular no edema to b/l lower extremities

## 2024-09-04 ENCOUNTER — NON-APPOINTMENT (OUTPATIENT)
Age: 84
End: 2024-09-04

## 2024-09-17 ENCOUNTER — APPOINTMENT (OUTPATIENT)
Dept: HOME HEALTH SERVICES | Facility: HOME HEALTH | Age: 84
End: 2024-09-17

## 2024-09-17 VITALS
BODY MASS INDEX: 31.27 KG/M2 | RESPIRATION RATE: 16 BRPM | SYSTOLIC BLOOD PRESSURE: 120 MMHG | HEART RATE: 74 BPM | TEMPERATURE: 97.8 F | DIASTOLIC BLOOD PRESSURE: 78 MMHG | OXYGEN SATURATION: 95 % | WEIGHT: 154.8 LBS

## 2024-10-14 ENCOUNTER — NON-APPOINTMENT (OUTPATIENT)
Age: 84
End: 2024-10-14

## 2024-10-15 ENCOUNTER — NON-APPOINTMENT (OUTPATIENT)
Age: 84
End: 2024-10-15

## 2024-10-17 ENCOUNTER — APPOINTMENT (OUTPATIENT)
Dept: HOME HEALTH SERVICES | Facility: HOME HEALTH | Age: 84
End: 2024-10-17
Payer: MEDICARE

## 2024-10-17 VITALS — HEART RATE: 92 BPM | SYSTOLIC BLOOD PRESSURE: 130 MMHG | OXYGEN SATURATION: 99 % | DIASTOLIC BLOOD PRESSURE: 80 MMHG

## 2024-10-17 DIAGNOSIS — N18.30 CHRONIC KIDNEY DISEASE, STAGE 3 UNSPECIFIED: ICD-10-CM

## 2024-10-17 DIAGNOSIS — U07.1 COVID-19: ICD-10-CM

## 2024-10-17 DIAGNOSIS — N17.9 ACUTE KIDNEY FAILURE, UNSPECIFIED: ICD-10-CM

## 2024-10-17 DIAGNOSIS — I50.32 CHRONIC DIASTOLIC (CONGESTIVE) HEART FAILURE: ICD-10-CM

## 2024-10-17 DIAGNOSIS — I48.91 UNSPECIFIED ATRIAL FIBRILLATION: ICD-10-CM

## 2024-10-17 PROCEDURE — 99495 TRANSJ CARE MGMT MOD F2F 14D: CPT

## 2024-10-17 RX ORDER — ALBUTEROL SULFATE 90 UG/1
108 (90 BASE) INHALANT RESPIRATORY (INHALATION) EVERY 4 HOURS
Qty: 8.5 | Refills: 3 | Status: ACTIVE | COMMUNITY
Start: 2024-10-17 | End: 1900-01-01

## 2024-11-26 ENCOUNTER — APPOINTMENT (OUTPATIENT)
Dept: NEPHROLOGY | Facility: CLINIC | Age: 84
End: 2024-11-26
Payer: MEDICARE

## 2024-11-26 VITALS
HEIGHT: 59 IN | HEART RATE: 88 BPM | RESPIRATION RATE: 18 BRPM | SYSTOLIC BLOOD PRESSURE: 122 MMHG | TEMPERATURE: 97.4 F | BODY MASS INDEX: 31.85 KG/M2 | DIASTOLIC BLOOD PRESSURE: 80 MMHG | OXYGEN SATURATION: 97 % | WEIGHT: 158 LBS

## 2024-11-26 DIAGNOSIS — E03.9 HYPOTHYROIDISM, UNSPECIFIED: ICD-10-CM

## 2024-11-26 DIAGNOSIS — N18.4 CHRONIC KIDNEY DISEASE, STAGE 4 (SEVERE): ICD-10-CM

## 2024-11-26 DIAGNOSIS — L29.9 PRURITUS, UNSPECIFIED: ICD-10-CM

## 2024-11-26 DIAGNOSIS — E21.3 HYPERPARATHYROIDISM, UNSPECIFIED: ICD-10-CM

## 2024-11-26 DIAGNOSIS — I50.32 CHRONIC DIASTOLIC (CONGESTIVE) HEART FAILURE: ICD-10-CM

## 2024-11-26 PROCEDURE — 99214 OFFICE O/P EST MOD 30 MIN: CPT

## 2024-11-26 RX ORDER — GABAPENTIN 100 MG/1
100 CAPSULE ORAL
Qty: 90 | Refills: 1 | Status: ACTIVE | COMMUNITY
Start: 2024-11-26 | End: 1900-01-01

## 2024-11-26 RX ORDER — ALLOPURINOL 100 MG/1
100 TABLET ORAL DAILY
Refills: 0 | Status: ACTIVE | COMMUNITY

## 2024-11-29 ENCOUNTER — TRANSCRIPTION ENCOUNTER (OUTPATIENT)
Age: 84
End: 2024-11-29

## 2024-12-11 ENCOUNTER — APPOINTMENT (OUTPATIENT)
Dept: HOME HEALTH SERVICES | Facility: HOME HEALTH | Age: 84
End: 2024-12-11

## 2024-12-11 VITALS
DIASTOLIC BLOOD PRESSURE: 62 MMHG | OXYGEN SATURATION: 97 % | HEART RATE: 85 BPM | BODY MASS INDEX: 31.1 KG/M2 | RESPIRATION RATE: 18 BRPM | WEIGHT: 154 LBS | SYSTOLIC BLOOD PRESSURE: 118 MMHG | TEMPERATURE: 98.3 F

## 2025-01-23 ENCOUNTER — APPOINTMENT (OUTPATIENT)
Dept: HOME HEALTH SERVICES | Facility: HOME HEALTH | Age: 85
End: 2025-01-23
Payer: MEDICARE

## 2025-01-23 VITALS
DIASTOLIC BLOOD PRESSURE: 86 MMHG | SYSTOLIC BLOOD PRESSURE: 120 MMHG | OXYGEN SATURATION: 93 % | HEART RATE: 87 BPM | WEIGHT: 151 LBS | BODY MASS INDEX: 30.5 KG/M2

## 2025-01-23 DIAGNOSIS — I48.91 UNSPECIFIED ATRIAL FIBRILLATION: ICD-10-CM

## 2025-01-23 DIAGNOSIS — E03.9 HYPOTHYROIDISM, UNSPECIFIED: ICD-10-CM

## 2025-01-23 DIAGNOSIS — I50.32 CHRONIC DIASTOLIC (CONGESTIVE) HEART FAILURE: ICD-10-CM

## 2025-01-23 DIAGNOSIS — Z87.898 PERSONAL HISTORY OF OTHER SPECIFIED CONDITIONS: ICD-10-CM

## 2025-01-23 DIAGNOSIS — N18.30 CHRONIC KIDNEY DISEASE, STAGE 3 UNSPECIFIED: ICD-10-CM

## 2025-01-23 DIAGNOSIS — U07.1 COVID-19: ICD-10-CM

## 2025-01-23 DIAGNOSIS — N18.4 CHRONIC KIDNEY DISEASE, STAGE 4 (SEVERE): ICD-10-CM

## 2025-01-23 PROCEDURE — 99349 HOME/RES VST EST MOD MDM 40: CPT

## 2025-02-12 ENCOUNTER — APPOINTMENT (OUTPATIENT)
Dept: NEPHROLOGY | Facility: CLINIC | Age: 85
End: 2025-02-12
Payer: MEDICARE

## 2025-02-12 VITALS
HEIGHT: 58 IN | SYSTOLIC BLOOD PRESSURE: 118 MMHG | BODY MASS INDEX: 32.32 KG/M2 | WEIGHT: 154 LBS | RESPIRATION RATE: 18 BRPM | OXYGEN SATURATION: 97 % | HEART RATE: 82 BPM | DIASTOLIC BLOOD PRESSURE: 76 MMHG | TEMPERATURE: 98 F

## 2025-02-12 DIAGNOSIS — E03.9 HYPOTHYROIDISM, UNSPECIFIED: ICD-10-CM

## 2025-02-12 DIAGNOSIS — I50.32 CHRONIC DIASTOLIC (CONGESTIVE) HEART FAILURE: ICD-10-CM

## 2025-02-12 DIAGNOSIS — I48.91 UNSPECIFIED ATRIAL FIBRILLATION: ICD-10-CM

## 2025-02-12 DIAGNOSIS — E21.3 HYPERPARATHYROIDISM, UNSPECIFIED: ICD-10-CM

## 2025-02-12 DIAGNOSIS — N18.4 CHRONIC KIDNEY DISEASE, STAGE 4 (SEVERE): ICD-10-CM

## 2025-02-12 PROCEDURE — 99214 OFFICE O/P EST MOD 30 MIN: CPT

## 2025-03-13 ENCOUNTER — APPOINTMENT (OUTPATIENT)
Dept: HOME HEALTH SERVICES | Facility: HOME HEALTH | Age: 85
End: 2025-03-13

## 2025-03-13 VITALS
TEMPERATURE: 97.9 F | DIASTOLIC BLOOD PRESSURE: 64 MMHG | HEART RATE: 78 BPM | SYSTOLIC BLOOD PRESSURE: 122 MMHG | RESPIRATION RATE: 18 BRPM | OXYGEN SATURATION: 95 %

## 2025-03-31 ENCOUNTER — APPOINTMENT (OUTPATIENT)
Dept: HOME HEALTH SERVICES | Facility: HOME HEALTH | Age: 85
End: 2025-03-31
Payer: MEDICARE

## 2025-03-31 VITALS — HEART RATE: 76 BPM | DIASTOLIC BLOOD PRESSURE: 88 MMHG | OXYGEN SATURATION: 95 % | SYSTOLIC BLOOD PRESSURE: 130 MMHG

## 2025-03-31 DIAGNOSIS — I50.32 CHRONIC DIASTOLIC (CONGESTIVE) HEART FAILURE: ICD-10-CM

## 2025-03-31 DIAGNOSIS — I48.91 UNSPECIFIED ATRIAL FIBRILLATION: ICD-10-CM

## 2025-03-31 DIAGNOSIS — E03.9 HYPOTHYROIDISM, UNSPECIFIED: ICD-10-CM

## 2025-03-31 DIAGNOSIS — N18.4 CHRONIC KIDNEY DISEASE, STAGE 4 (SEVERE): ICD-10-CM

## 2025-03-31 PROCEDURE — 99349 HOME/RES VST EST MOD MDM 40: CPT

## 2025-06-06 ENCOUNTER — APPOINTMENT (OUTPATIENT)
Dept: HOME HEALTH SERVICES | Facility: HOME HEALTH | Age: 85
End: 2025-06-06

## 2025-06-07 VITALS — BODY MASS INDEX: 31.98 KG/M2 | WEIGHT: 153 LBS

## 2025-06-07 VITALS
HEART RATE: 72 BPM | SYSTOLIC BLOOD PRESSURE: 120 MMHG | TEMPERATURE: 98.2 F | RESPIRATION RATE: 18 BRPM | OXYGEN SATURATION: 96 % | DIASTOLIC BLOOD PRESSURE: 64 MMHG

## 2025-06-11 ENCOUNTER — APPOINTMENT (OUTPATIENT)
Dept: NEPHROLOGY | Facility: CLINIC | Age: 85
End: 2025-06-11

## 2025-06-11 VITALS
WEIGHT: 156 LBS | SYSTOLIC BLOOD PRESSURE: 120 MMHG | DIASTOLIC BLOOD PRESSURE: 68 MMHG | OXYGEN SATURATION: 95 % | HEIGHT: 58 IN | RESPIRATION RATE: 18 BRPM | BODY MASS INDEX: 32.75 KG/M2 | TEMPERATURE: 97.8 F | HEART RATE: 81 BPM

## 2025-06-11 PROCEDURE — 99214 OFFICE O/P EST MOD 30 MIN: CPT

## 2025-06-11 RX ORDER — CALCITRIOL 0.25 UG/1
0.25 CAPSULE, LIQUID FILLED ORAL
Qty: 45 | Refills: 1 | Status: ACTIVE | COMMUNITY
Start: 2025-06-11 | End: 1900-01-01

## 2025-07-07 ENCOUNTER — LABORATORY RESULT (OUTPATIENT)
Age: 85
End: 2025-07-07

## 2025-07-28 ENCOUNTER — APPOINTMENT (OUTPATIENT)
Dept: HOME HEALTH SERVICES | Facility: HOME HEALTH | Age: 85
End: 2025-07-28
Payer: MEDICARE

## 2025-07-28 VITALS — DIASTOLIC BLOOD PRESSURE: 80 MMHG | SYSTOLIC BLOOD PRESSURE: 120 MMHG | HEART RATE: 80 BPM | OXYGEN SATURATION: 97 %

## 2025-07-28 DIAGNOSIS — N18.4 CHRONIC KIDNEY DISEASE, STAGE 4 (SEVERE): ICD-10-CM

## 2025-07-28 DIAGNOSIS — I48.91 UNSPECIFIED ATRIAL FIBRILLATION: ICD-10-CM

## 2025-07-28 DIAGNOSIS — I50.32 CHRONIC DIASTOLIC (CONGESTIVE) HEART FAILURE: ICD-10-CM

## 2025-07-28 DIAGNOSIS — E03.9 HYPOTHYROIDISM, UNSPECIFIED: ICD-10-CM

## 2025-07-28 PROCEDURE — 99349 HOME/RES VST EST MOD MDM 40: CPT

## 2025-08-06 ENCOUNTER — RX RENEWAL (OUTPATIENT)
Age: 85
End: 2025-08-06

## 2025-09-17 ENCOUNTER — APPOINTMENT (OUTPATIENT)
Dept: NEPHROLOGY | Facility: CLINIC | Age: 85
End: 2025-09-17
Payer: MEDICARE

## 2025-09-17 ENCOUNTER — APPOINTMENT (OUTPATIENT)
Dept: HOME HEALTH SERVICES | Facility: HOME HEALTH | Age: 85
End: 2025-09-17

## 2025-09-17 VITALS
WEIGHT: 151.6 LBS | TEMPERATURE: 97.4 F | BODY MASS INDEX: 31.68 KG/M2 | OXYGEN SATURATION: 95 % | DIASTOLIC BLOOD PRESSURE: 72 MMHG | RESPIRATION RATE: 18 BRPM | SYSTOLIC BLOOD PRESSURE: 122 MMHG | HEART RATE: 84 BPM

## 2025-09-17 VITALS
HEART RATE: 82 BPM | DIASTOLIC BLOOD PRESSURE: 64 MMHG | BODY MASS INDEX: 31.82 KG/M2 | HEIGHT: 58 IN | RESPIRATION RATE: 19 BRPM | SYSTOLIC BLOOD PRESSURE: 118 MMHG | OXYGEN SATURATION: 95 % | WEIGHT: 151.6 LBS | TEMPERATURE: 97.5 F

## 2025-09-17 DIAGNOSIS — R82.998 OTHER ABNORMAL FINDINGS IN URINE: ICD-10-CM

## 2025-09-17 DIAGNOSIS — E21.3 HYPERPARATHYROIDISM, UNSPECIFIED: ICD-10-CM

## 2025-09-17 DIAGNOSIS — I50.32 CHRONIC DIASTOLIC (CONGESTIVE) HEART FAILURE: ICD-10-CM

## 2025-09-17 DIAGNOSIS — Z13.31 ENCOUNTER FOR SCREENING FOR DEPRESSION: ICD-10-CM

## 2025-09-17 DIAGNOSIS — I48.91 UNSPECIFIED ATRIAL FIBRILLATION: ICD-10-CM

## 2025-09-17 DIAGNOSIS — N18.4 CHRONIC KIDNEY DISEASE, STAGE 4 (SEVERE): ICD-10-CM

## 2025-09-17 PROCEDURE — 99214 OFFICE O/P EST MOD 30 MIN: CPT
